# Patient Record
Sex: MALE | Race: WHITE | Employment: OTHER | URBAN - METROPOLITAN AREA
[De-identification: names, ages, dates, MRNs, and addresses within clinical notes are randomized per-mention and may not be internally consistent; named-entity substitution may affect disease eponyms.]

---

## 2020-02-06 ENCOUNTER — HOSPITAL ENCOUNTER (OUTPATIENT)
Dept: GENERAL RADIOLOGY | Age: 70
Discharge: HOME | End: 2020-02-06
Attending: PHYSICIAN ASSISTANT
Payer: MEDICARE

## 2020-02-06 DIAGNOSIS — M25.551 RIGHT HIP PAIN: ICD-10-CM

## 2020-02-06 PROCEDURE — 73502 X-RAY EXAM HIP UNI 2-3 VIEWS: CPT

## 2020-03-05 ENCOUNTER — HOSPITAL ENCOUNTER (OUTPATIENT)
Dept: PREADMISSION TESTING | Age: 70
Discharge: HOME | End: 2020-03-05
Payer: MEDICARE

## 2020-03-05 VITALS
DIASTOLIC BLOOD PRESSURE: 79 MMHG | TEMPERATURE: 98.1 F | HEIGHT: 72 IN | BODY MASS INDEX: 26.55 KG/M2 | RESPIRATION RATE: 18 BRPM | HEART RATE: 69 BPM | WEIGHT: 196 LBS | SYSTOLIC BLOOD PRESSURE: 176 MMHG

## 2020-03-05 DIAGNOSIS — R73.09 ELEVATED HEMOGLOBIN A1C: ICD-10-CM

## 2020-03-05 LAB
ABO + RH BLD: NORMAL
APPEARANCE UR: CLEAR
BACTERIA URNS QL MICRO: NEGATIVE /HPF
BILIRUB UR QL: NEGATIVE
BLOOD GROUP ANTIBODIES SERPL: NORMAL
COLOR UR: ABNORMAL
EPITH CASTS URNS QL MICRO: ABNORMAL /LPF
EST. AVERAGE GLUCOSE BLD GHB EST-MCNC: 171 MG/DL
GLUCOSE UR STRIP.AUTO-MCNC: >1000 MG/DL
HBA1C MFR BLD: 7.6 % (ref 4–5.6)
HGB UR QL STRIP: NEGATIVE
HYALINE CASTS URNS QL MICRO: ABNORMAL /LPF (ref 0–5)
INR PPP: 1.1 (ref 0.9–1.1)
KETONES UR QL STRIP.AUTO: ABNORMAL MG/DL
LEUKOCYTE ESTERASE UR QL STRIP.AUTO: NEGATIVE
NITRITE UR QL STRIP.AUTO: NEGATIVE
PH UR STRIP: 5 [PH] (ref 5–8)
PROT UR STRIP-MCNC: NEGATIVE MG/DL
PROTHROMBIN TIME: 10.8 SEC (ref 9–11.1)
RBC #/AREA URNS HPF: ABNORMAL /HPF (ref 0–5)
SP GR UR REFRACTOMETRY: 1.03 (ref 1–1.03)
SPECIMEN EXP DATE BLD: NORMAL
UA: UC IF INDICATED,UAUC: ABNORMAL
UROBILINOGEN UR QL STRIP.AUTO: 0.2 EU/DL (ref 0.2–1)
WBC URNS QL MICRO: ABNORMAL /HPF (ref 0–4)

## 2020-03-05 PROCEDURE — 85610 PROTHROMBIN TIME: CPT

## 2020-03-05 PROCEDURE — 83036 HEMOGLOBIN GLYCOSYLATED A1C: CPT

## 2020-03-05 PROCEDURE — 86900 BLOOD TYPING SEROLOGIC ABO: CPT

## 2020-03-05 PROCEDURE — 81001 URINALYSIS AUTO W/SCOPE: CPT

## 2020-03-05 RX ORDER — LISINOPRIL 10 MG/1
10 TABLET ORAL
COMMUNITY
End: 2020-03-16 | Stop reason: SDUPTHER

## 2020-03-05 RX ORDER — IBUPROFEN 200 MG
200 TABLET ORAL
COMMUNITY
End: 2020-05-07

## 2020-03-05 RX ORDER — AMLODIPINE BESYLATE 5 MG/1
5 TABLET ORAL
COMMUNITY
End: 2020-03-16 | Stop reason: SDUPTHER

## 2020-03-05 NOTE — PERIOP NOTES
PRE-OPERATIVE INSTRUCTIONS REVIEWED WITH PATIENT. PT GIVEN TIME TO ASK QUESTIONS    PATIENT GIVEN 2-BOTTLE OF CHG SOAP. INSTRUCTIONS (REVIEWED / TO BE REVIEWED IN CLASS) ON USE OF CHG SOAP. PATIENT GIVEN SSI INFECTION FAQ SHEET.   MRSA / MSSA TREATMENT INSTRUCTION SHEET GIVEN

## 2020-03-06 LAB
BACTERIA SPEC CULT: NORMAL
BACTERIA SPEC CULT: NORMAL
SERVICE CMNT-IMP: NORMAL

## 2020-03-06 NOTE — PERIOP NOTES
PAT TEST RESULTS FAXED TO DR. SHELTON'S OFFICE, UNABLE TO FIND \"PROGRAM FOR DIABETES HEALTH\" IN CC FOR REFERRAL, WILL GIVE CHART TO ENRIQUE STEVENSON NP TO HELP WITH THAT. ABNORMAL RESULST CALLED TO SURGEON'S NURSE. PAT PHONE NUMBER GIVEN FOR RETURN CALL IF NEEDED.

## 2020-03-09 PROBLEM — Z22.321 MSSA (METHICILLIN-SUSCEPTIBLE STAPH AUREUS) CARRIER: Status: ACTIVE | Noted: 2020-03-09

## 2020-03-09 PROBLEM — R73.09 ELEVATED HEMOGLOBIN A1C: Status: ACTIVE | Noted: 2020-03-09

## 2020-03-09 RX ORDER — MUPIROCIN 20 MG/G
OINTMENT TOPICAL 2 TIMES DAILY
Qty: 22 G | Refills: 0 | Status: SHIPPED | OUTPATIENT
Start: 2020-03-10 | End: 2020-03-18

## 2020-03-09 RX ORDER — CHLORHEXIDINE GLUCONATE 4 G/100ML
60-120 SOLUTION TOPICAL DAILY
Qty: 840 ML | Refills: 0 | Status: SHIPPED | OUTPATIENT
Start: 2020-03-10 | End: 2020-03-18

## 2020-03-09 NOTE — ADVANCED PRACTICE NURSE
Patient contacted in regard to A1C 7.6 without prior DM dx. He denies increased urination, hunger, thirst, weight loss. Patient educated about A1C test and potential complications of uncontrolled blood glucose and surgery, including infection and difficulty healing. Provided lifestyle modification information. PAT labs routed to PCP, BRAEDEN Bashir, and patient informed of need to f/u. DTC referral complete. Patient was called (identity confirmed with 2 patient identifiers) and informed of positive MSSA, instructed to obtain mupirocin prescription and Chlorhexidine liquid soap from pharmacy per protocol. Written instructions given at time of Preadmission Testing were reinforced with patient. Patient was given an opportunity to have questions answered and contact information if needed. Lea Chavis informed of the above info. 3/9 Taniya Stein PA confirmed receipt of A1C results.

## 2020-03-13 RX ORDER — CELECOXIB 200 MG/1
200 CAPSULE ORAL ONCE
Status: CANCELLED | OUTPATIENT
Start: 2020-03-13 | End: 2020-03-13

## 2020-03-13 RX ORDER — CEFAZOLIN SODIUM/WATER 2 G/20 ML
2 SYRINGE (ML) INTRAVENOUS ONCE
Status: CANCELLED | OUTPATIENT
Start: 2020-03-17 | End: 2020-03-17

## 2020-03-13 RX ORDER — ACETAMINOPHEN 500 MG
1000 TABLET ORAL ONCE
Status: CANCELLED | OUTPATIENT
Start: 2020-03-13 | End: 2020-03-13

## 2020-03-16 ENCOUNTER — ANESTHESIA EVENT (OUTPATIENT)
Dept: SURGERY | Age: 70
End: 2020-03-16
Payer: MEDICARE

## 2020-03-16 NOTE — DISCHARGE INSTRUCTIONS
Post-op Discharge Instructions Following Total Joint Replacement  Hayes Hilario MD  [address]  [phone], ext [phone]  Follow-up Office Visit   See Dr. Jessica Wolfe approximately 3-4 weeks from date of surgery. Call [phone], ext [phone] to make an appointment. Activity   Use your walker for ambulation. Weight bearing as tolerated unless instructed otherwise by the physical therapist. Get up every hour you are awake and take a brief walk. Lengthen walking distance daily as your strength improves.  Continue using your walker until seen in the office for your first follow up visit.  Practice your exercises 3 times daily as instructed by the physical therapist. Bobbette Ramp for 20 minutes after exercising.  No driving until seen in the office for your first follow up visit. Incision Care   The light brown Aquacel surgical dressing is waterproof and is to remain on your incision for 7 days. On the 7th day, carefully lift the edge of the dressing to break the adhesive seal and gently peel it off.  If your Aquacel dressings comes loose or falls off before the 7th day, replace it with a dry sterile gauze dressing and change this dressing daily. Once there is no drainage on the bandage, you mean leave the incision open to air.  You may take a shower with the Aquacel dressing in place. After you remove the Aquacel dressing on day 7, you may continue to shower and get your incision wet in the shower. Do not submerge your incision under water in a bathtub, hot tub, swimming pool, etc. until after you have been evaluated at your first office visit. Medications   Blood Clot Prevention: Take medication as prescribed by your physician for 4 weeks postop.  Pain Management: Take pain medication as prescribed; wean yourself off of pain medication as your pain lessens. Take with food.  You make also take Tylenol every 4-6 hours as needed for pain. Do not exceed 3 grams (3000mg) per day.    Place an ice bag on or around the incision for 20 minutes on / 20 minutes off as needed throughout the day and night, especially after exercising.  Stool Softener: You may want to take a stool softener (such as Senokot-S or Colace) to prevent constipation while taking pain medication. If constipation occurs, you may also use a laxative (such as Dulcolax tablets, Miralax, or a suppository). Diet   Resume usual diet at home. Drink plenty of fluids. Eat foods high in fiber and protein. Calcium and Vitamin D supplements recommended. Avoid alcoholic beverages. No smoking. When to call your Orthopaedic Surgeon: If you call after 5pm or on a weekend, the on call physician will return your call   Pain that is not relieved by pain medication, ice, and activity modification   Signs of infection (red incision, continuous drainage from the incision, malodorous drainage, persistent fever greater than 101 degrees Fahrenheit)   Signs of a blood clot in your leg (calf pain, tenderness, redness, and/or swelling of the lower leg)  ?   When to call your Primary Care Physician   Concerns about your medical conditions such as diabetes, high blood pressure, asthma, congestive heart failure   Call if blood sugars are elevated, if you have a persistent headache or dizziness, coughing or congestion, constipation or diarrhea, burning with urination, abnormal heart rate (fast or slow)  When to call 911 and go to the nearest Emergency Room   Acute onset of chest pain, shortness of breath, difficulty breathing

## 2020-03-17 ENCOUNTER — ANESTHESIA (OUTPATIENT)
Dept: SURGERY | Age: 70
End: 2020-03-17
Payer: MEDICARE

## 2020-03-17 ENCOUNTER — APPOINTMENT (OUTPATIENT)
Dept: GENERAL RADIOLOGY | Age: 70
End: 2020-03-17
Attending: PHYSICIAN ASSISTANT
Payer: MEDICARE

## 2020-03-17 ENCOUNTER — HOSPITAL ENCOUNTER (OUTPATIENT)
Age: 70
Setting detail: OBSERVATION
Discharge: HOME HEALTH | End: 2020-03-18
Attending: ORTHOPAEDIC SURGERY | Admitting: ORTHOPAEDIC SURGERY
Payer: MEDICARE

## 2020-03-17 DIAGNOSIS — M16.11 PRIMARY LOCALIZED OSTEOARTHRITIS OF RIGHT HIP: Primary | ICD-10-CM

## 2020-03-17 PROBLEM — E11.9 TYPE II DIABETES MELLITUS (HCC): Status: ACTIVE | Noted: 2020-03-17

## 2020-03-17 LAB
GLUCOSE BLD STRIP.AUTO-MCNC: 176 MG/DL (ref 65–100)
GLUCOSE BLD STRIP.AUTO-MCNC: 371 MG/DL (ref 65–100)
SERVICE CMNT-IMP: ABNORMAL
SERVICE CMNT-IMP: ABNORMAL

## 2020-03-17 PROCEDURE — 77030003882 HC BIT DRL TWST BRSM -B: Performed by: ORTHOPAEDIC SURGERY

## 2020-03-17 PROCEDURE — 99218 HC RM OBSERVATION: CPT

## 2020-03-17 PROCEDURE — 77030018836 HC SOL IRR NACL ICUM -A: Performed by: ORTHOPAEDIC SURGERY

## 2020-03-17 PROCEDURE — 77030031139 HC SUT VCRL2 J&J -A: Performed by: ORTHOPAEDIC SURGERY

## 2020-03-17 PROCEDURE — 97116 GAIT TRAINING THERAPY: CPT

## 2020-03-17 PROCEDURE — 74011000250 HC RX REV CODE- 250: Performed by: NURSE ANESTHETIST, CERTIFIED REGISTERED

## 2020-03-17 PROCEDURE — 77030033067 HC SUT PDO STRATFX SPIR J&J -B: Performed by: ORTHOPAEDIC SURGERY

## 2020-03-17 PROCEDURE — 76060000035 HC ANESTHESIA 2 TO 2.5 HR: Performed by: ORTHOPAEDIC SURGERY

## 2020-03-17 PROCEDURE — 77030018673: Performed by: ORTHOPAEDIC SURGERY

## 2020-03-17 PROCEDURE — C1776 JOINT DEVICE (IMPLANTABLE): HCPCS | Performed by: ORTHOPAEDIC SURGERY

## 2020-03-17 PROCEDURE — 77030007866 HC KT SPN ANES BBMI -B: Performed by: ANESTHESIOLOGY

## 2020-03-17 PROCEDURE — 74011250636 HC RX REV CODE- 250/636: Performed by: ANESTHESIOLOGY

## 2020-03-17 PROCEDURE — 74011250637 HC RX REV CODE- 250/637: Performed by: ORTHOPAEDIC SURGERY

## 2020-03-17 PROCEDURE — 74011000250 HC RX REV CODE- 250: Performed by: ORTHOPAEDIC SURGERY

## 2020-03-17 PROCEDURE — 74011000258 HC RX REV CODE- 258: Performed by: ORTHOPAEDIC SURGERY

## 2020-03-17 PROCEDURE — 76010000171 HC OR TIME 2 TO 2.5 HR INTENSV-TIER 1: Performed by: ORTHOPAEDIC SURGERY

## 2020-03-17 PROCEDURE — 77030011640 HC PAD GRND REM COVD -A: Performed by: ORTHOPAEDIC SURGERY

## 2020-03-17 PROCEDURE — 73501 X-RAY EXAM HIP UNI 1 VIEW: CPT

## 2020-03-17 PROCEDURE — 74011000272 HC RX REV CODE- 272: Performed by: ORTHOPAEDIC SURGERY

## 2020-03-17 PROCEDURE — 77030041397 HC DRSG PRIMASEAL AG MDII -B: Performed by: ORTHOPAEDIC SURGERY

## 2020-03-17 PROCEDURE — 74011250636 HC RX REV CODE- 250/636: Performed by: NURSE ANESTHETIST, CERTIFIED REGISTERED

## 2020-03-17 PROCEDURE — 82962 GLUCOSE BLOOD TEST: CPT

## 2020-03-17 PROCEDURE — 77030002933 HC SUT MCRYL J&J -A: Performed by: ORTHOPAEDIC SURGERY

## 2020-03-17 PROCEDURE — 94760 N-INVAS EAR/PLS OXIMETRY 1: CPT

## 2020-03-17 PROCEDURE — 74011250636 HC RX REV CODE- 250/636

## 2020-03-17 PROCEDURE — 76210000063 HC OR PH I REC FIRST 0.5 HR: Performed by: ORTHOPAEDIC SURGERY

## 2020-03-17 PROCEDURE — 77030040361 HC SLV COMPR DVT MDII -B

## 2020-03-17 PROCEDURE — 77030010507 HC ADH SKN DERMBND J&J -B: Performed by: ORTHOPAEDIC SURGERY

## 2020-03-17 PROCEDURE — 77030006822 HC BLD SAW SAG BRSM -B: Performed by: ORTHOPAEDIC SURGERY

## 2020-03-17 PROCEDURE — 77030018547 HC SUT ETHBND1 J&J -B: Performed by: ORTHOPAEDIC SURGERY

## 2020-03-17 PROCEDURE — 77030018723 HC ELCTRD BLD COVD -A: Performed by: ORTHOPAEDIC SURGERY

## 2020-03-17 PROCEDURE — 97161 PT EVAL LOW COMPLEX 20 MIN: CPT

## 2020-03-17 PROCEDURE — 74011250637 HC RX REV CODE- 250/637: Performed by: PHYSICIAN ASSISTANT

## 2020-03-17 PROCEDURE — 74011250636 HC RX REV CODE- 250/636: Performed by: ORTHOPAEDIC SURGERY

## 2020-03-17 PROCEDURE — 74011250636 HC RX REV CODE- 250/636: Performed by: PHYSICIAN ASSISTANT

## 2020-03-17 PROCEDURE — 77030018074 HC RTVR SUT ARTH4 S&N -B: Performed by: ORTHOPAEDIC SURGERY

## 2020-03-17 PROCEDURE — 77030018846 HC SOL IRR STRL H20 ICUM -A: Performed by: ORTHOPAEDIC SURGERY

## 2020-03-17 PROCEDURE — 74011636637 HC RX REV CODE- 636/637: Performed by: ORTHOPAEDIC SURGERY

## 2020-03-17 DEVICE — COMPONENT HIP PRSS FT H1 CERM ON CERM POLYETH: Type: IMPLANTABLE DEVICE | Status: FUNCTIONAL

## 2020-03-17 RX ORDER — CEFAZOLIN SODIUM/WATER 2 G/20 ML
2 SYRINGE (ML) INTRAVENOUS ONCE
Status: COMPLETED | OUTPATIENT
Start: 2020-03-17 | End: 2020-03-17

## 2020-03-17 RX ORDER — BUPIVACAINE HYDROCHLORIDE 5 MG/ML
INJECTION, SOLUTION EPIDURAL; INTRACAUDAL AS NEEDED
Status: DISCONTINUED | OUTPATIENT
Start: 2020-03-17 | End: 2020-03-17 | Stop reason: HOSPADM

## 2020-03-17 RX ORDER — HYDROMORPHONE HYDROCHLORIDE 1 MG/ML
0.5 INJECTION, SOLUTION INTRAMUSCULAR; INTRAVENOUS; SUBCUTANEOUS
Status: DISCONTINUED | OUTPATIENT
Start: 2020-03-17 | End: 2020-03-18 | Stop reason: HOSPADM

## 2020-03-17 RX ORDER — ACETAMINOPHEN 500 MG
500 TABLET ORAL EVERY 4 HOURS
Qty: 100 TAB | Refills: 0 | Status: SHIPPED
Start: 2020-03-17 | End: 2021-03-04

## 2020-03-17 RX ORDER — FACIAL-BODY WIPES
10 EACH TOPICAL DAILY PRN
Status: DISCONTINUED | OUTPATIENT
Start: 2020-03-18 | End: 2020-03-18 | Stop reason: HOSPADM

## 2020-03-17 RX ORDER — DEXAMETHASONE SODIUM PHOSPHATE 4 MG/ML
INJECTION, SOLUTION INTRA-ARTICULAR; INTRALESIONAL; INTRAMUSCULAR; INTRAVENOUS; SOFT TISSUE AS NEEDED
Status: DISCONTINUED | OUTPATIENT
Start: 2020-03-17 | End: 2020-03-17 | Stop reason: HOSPADM

## 2020-03-17 RX ORDER — INSULIN LISPRO 100 [IU]/ML
INJECTION, SOLUTION INTRAVENOUS; SUBCUTANEOUS
Status: DISCONTINUED | OUTPATIENT
Start: 2020-03-17 | End: 2020-03-18 | Stop reason: HOSPADM

## 2020-03-17 RX ORDER — LISINOPRIL 10 MG/1
20 TABLET ORAL 2 TIMES DAILY
Status: DISCONTINUED | OUTPATIENT
Start: 2020-03-17 | End: 2020-03-18 | Stop reason: HOSPADM

## 2020-03-17 RX ORDER — AMOXICILLIN 250 MG
1 CAPSULE ORAL 2 TIMES DAILY
Status: DISCONTINUED | OUTPATIENT
Start: 2020-03-17 | End: 2020-03-18 | Stop reason: HOSPADM

## 2020-03-17 RX ORDER — ONDANSETRON 2 MG/ML
INJECTION INTRAMUSCULAR; INTRAVENOUS
Status: COMPLETED
Start: 2020-03-17 | End: 2020-03-17

## 2020-03-17 RX ORDER — TRAMADOL HYDROCHLORIDE 50 MG/1
50-100 TABLET ORAL
Status: DISCONTINUED | OUTPATIENT
Start: 2020-03-17 | End: 2020-03-18 | Stop reason: HOSPADM

## 2020-03-17 RX ORDER — HYDROXYZINE HYDROCHLORIDE 10 MG/1
10 TABLET, FILM COATED ORAL
Status: DISCONTINUED | OUTPATIENT
Start: 2020-03-17 | End: 2020-03-18 | Stop reason: HOSPADM

## 2020-03-17 RX ORDER — MIDAZOLAM HYDROCHLORIDE 1 MG/ML
INJECTION, SOLUTION INTRAMUSCULAR; INTRAVENOUS AS NEEDED
Status: DISCONTINUED | OUTPATIENT
Start: 2020-03-17 | End: 2020-03-17 | Stop reason: HOSPADM

## 2020-03-17 RX ORDER — AMOXICILLIN 250 MG
1 CAPSULE ORAL
Qty: 60 TAB | Refills: 0 | Status: SHIPPED | OUTPATIENT
Start: 2020-03-17 | End: 2020-05-07

## 2020-03-17 RX ORDER — ASPIRIN 81 MG/1
81 TABLET ORAL EVERY 12 HOURS
Status: DISCONTINUED | OUTPATIENT
Start: 2020-03-17 | End: 2020-03-18 | Stop reason: HOSPADM

## 2020-03-17 RX ORDER — SODIUM CHLORIDE 0.9 % (FLUSH) 0.9 %
5-40 SYRINGE (ML) INJECTION EVERY 8 HOURS
Status: DISCONTINUED | OUTPATIENT
Start: 2020-03-17 | End: 2020-03-18 | Stop reason: HOSPADM

## 2020-03-17 RX ORDER — CEFAZOLIN SODIUM/WATER 2 G/20 ML
2 SYRINGE (ML) INTRAVENOUS EVERY 8 HOURS
Status: COMPLETED | OUTPATIENT
Start: 2020-03-17 | End: 2020-03-18

## 2020-03-17 RX ORDER — MAGNESIUM SULFATE 100 %
4 CRYSTALS MISCELLANEOUS AS NEEDED
Status: DISCONTINUED | OUTPATIENT
Start: 2020-03-17 | End: 2020-03-18 | Stop reason: HOSPADM

## 2020-03-17 RX ORDER — POLYETHYLENE GLYCOL 3350 17 G/17G
17 POWDER, FOR SOLUTION ORAL DAILY
Status: DISCONTINUED | OUTPATIENT
Start: 2020-03-18 | End: 2020-03-18 | Stop reason: HOSPADM

## 2020-03-17 RX ORDER — PHENYLEPHRINE HCL IN 0.9% NACL 0.4MG/10ML
SYRINGE (ML) INTRAVENOUS AS NEEDED
Status: DISCONTINUED | OUTPATIENT
Start: 2020-03-17 | End: 2020-03-17 | Stop reason: HOSPADM

## 2020-03-17 RX ORDER — ONDANSETRON 2 MG/ML
INJECTION INTRAMUSCULAR; INTRAVENOUS AS NEEDED
Status: DISCONTINUED | OUTPATIENT
Start: 2020-03-17 | End: 2020-03-17 | Stop reason: HOSPADM

## 2020-03-17 RX ORDER — ONDANSETRON 2 MG/ML
4 INJECTION INTRAMUSCULAR; INTRAVENOUS
Status: DISCONTINUED | OUTPATIENT
Start: 2020-03-17 | End: 2020-03-18 | Stop reason: HOSPADM

## 2020-03-17 RX ORDER — TRAMADOL HYDROCHLORIDE 50 MG/1
50-100 TABLET ORAL
Qty: 42 TAB | Refills: 0 | Status: SHIPPED | OUTPATIENT
Start: 2020-03-17 | End: 2020-03-24

## 2020-03-17 RX ORDER — SODIUM CHLORIDE, SODIUM LACTATE, POTASSIUM CHLORIDE, CALCIUM CHLORIDE 600; 310; 30; 20 MG/100ML; MG/100ML; MG/100ML; MG/100ML
25 INJECTION, SOLUTION INTRAVENOUS CONTINUOUS
Status: DISCONTINUED | OUTPATIENT
Start: 2020-03-17 | End: 2020-03-17 | Stop reason: HOSPADM

## 2020-03-17 RX ORDER — DEXTROSE MONOHYDRATE 100 MG/ML
0-250 INJECTION, SOLUTION INTRAVENOUS AS NEEDED
Status: DISCONTINUED | OUTPATIENT
Start: 2020-03-17 | End: 2020-03-18 | Stop reason: HOSPADM

## 2020-03-17 RX ORDER — AMLODIPINE BESYLATE 5 MG/1
10 TABLET ORAL DAILY
Status: DISCONTINUED | OUTPATIENT
Start: 2020-03-18 | End: 2020-03-18 | Stop reason: HOSPADM

## 2020-03-17 RX ORDER — SODIUM CHLORIDE 9 MG/ML
125 INJECTION, SOLUTION INTRAVENOUS CONTINUOUS
Status: DISCONTINUED | OUTPATIENT
Start: 2020-03-17 | End: 2020-03-18 | Stop reason: HOSPADM

## 2020-03-17 RX ORDER — ONDANSETRON 2 MG/ML
4 INJECTION INTRAMUSCULAR; INTRAVENOUS ONCE
Status: COMPLETED | OUTPATIENT
Start: 2020-03-17 | End: 2020-03-17

## 2020-03-17 RX ORDER — ACETAMINOPHEN 500 MG
500 TABLET ORAL EVERY 4 HOURS
Status: DISCONTINUED | OUTPATIENT
Start: 2020-03-17 | End: 2020-03-18 | Stop reason: HOSPADM

## 2020-03-17 RX ORDER — NALOXONE HYDROCHLORIDE 0.4 MG/ML
0.4 INJECTION, SOLUTION INTRAMUSCULAR; INTRAVENOUS; SUBCUTANEOUS AS NEEDED
Status: DISCONTINUED | OUTPATIENT
Start: 2020-03-17 | End: 2020-03-18 | Stop reason: HOSPADM

## 2020-03-17 RX ORDER — EPHEDRINE SULFATE/0.9% NACL/PF 50 MG/5 ML
SYRINGE (ML) INTRAVENOUS AS NEEDED
Status: DISCONTINUED | OUTPATIENT
Start: 2020-03-17 | End: 2020-03-17 | Stop reason: HOSPADM

## 2020-03-17 RX ORDER — ASPIRIN 81 MG/1
81 TABLET ORAL 2 TIMES DAILY
Qty: 60 TAB | Refills: 0 | Status: SHIPPED | OUTPATIENT
Start: 2020-03-17

## 2020-03-17 RX ORDER — SODIUM CHLORIDE 0.9 % (FLUSH) 0.9 %
5-40 SYRINGE (ML) INJECTION AS NEEDED
Status: DISCONTINUED | OUTPATIENT
Start: 2020-03-17 | End: 2020-03-18 | Stop reason: HOSPADM

## 2020-03-17 RX ORDER — LIDOCAINE HYDROCHLORIDE 20 MG/ML
INJECTION, SOLUTION EPIDURAL; INFILTRATION; INTRACAUDAL; PERINEURAL AS NEEDED
Status: DISCONTINUED | OUTPATIENT
Start: 2020-03-17 | End: 2020-03-17 | Stop reason: HOSPADM

## 2020-03-17 RX ORDER — ACETAMINOPHEN 500 MG
1000 TABLET ORAL ONCE
Status: COMPLETED | OUTPATIENT
Start: 2020-03-17 | End: 2020-03-17

## 2020-03-17 RX ORDER — KETOROLAC TROMETHAMINE 30 MG/ML
15 INJECTION, SOLUTION INTRAMUSCULAR; INTRAVENOUS EVERY 6 HOURS
Status: DISCONTINUED | OUTPATIENT
Start: 2020-03-17 | End: 2020-03-18 | Stop reason: HOSPADM

## 2020-03-17 RX ORDER — PROPOFOL 10 MG/ML
INJECTION, EMULSION INTRAVENOUS
Status: DISCONTINUED | OUTPATIENT
Start: 2020-03-17 | End: 2020-03-17 | Stop reason: HOSPADM

## 2020-03-17 RX ORDER — CELECOXIB 200 MG/1
200 CAPSULE ORAL ONCE
Status: COMPLETED | OUTPATIENT
Start: 2020-03-17 | End: 2020-03-17

## 2020-03-17 RX ORDER — PROPOFOL 10 MG/ML
INJECTION, EMULSION INTRAVENOUS AS NEEDED
Status: DISCONTINUED | OUTPATIENT
Start: 2020-03-17 | End: 2020-03-17 | Stop reason: HOSPADM

## 2020-03-17 RX ADMIN — INSULIN LISPRO 4 UNITS: 100 INJECTION, SOLUTION INTRAVENOUS; SUBCUTANEOUS at 22:39

## 2020-03-17 RX ADMIN — ASPIRIN 81 MG: 81 TABLET, COATED ORAL at 14:06

## 2020-03-17 RX ADMIN — PROPOFOL 75 MCG/KG/MIN: 10 INJECTION, EMULSION INTRAVENOUS at 08:58

## 2020-03-17 RX ADMIN — PROPOFOL 40 MG: 10 INJECTION, EMULSION INTRAVENOUS at 08:58

## 2020-03-17 RX ADMIN — LIDOCAINE HYDROCHLORIDE 40 MG: 20 INJECTION, SOLUTION EPIDURAL; INFILTRATION; INTRACAUDAL; PERINEURAL at 08:58

## 2020-03-17 RX ADMIN — BUPIVACAINE HYDROCHLORIDE 10 MG: 5 INJECTION, SOLUTION EPIDURAL; INTRACAUDAL; PERINEURAL at 09:05

## 2020-03-17 RX ADMIN — SENNOSIDES AND DOCUSATE SODIUM 1 TABLET: 8.6; 5 TABLET ORAL at 17:02

## 2020-03-17 RX ADMIN — SODIUM CHLORIDE, SODIUM LACTATE, POTASSIUM CHLORIDE, AND CALCIUM CHLORIDE 25 ML/HR: 600; 310; 30; 20 INJECTION, SOLUTION INTRAVENOUS at 08:07

## 2020-03-17 RX ADMIN — ONDANSETRON HYDROCHLORIDE 4 MG: 2 INJECTION, SOLUTION INTRAMUSCULAR; INTRAVENOUS at 10:18

## 2020-03-17 RX ADMIN — Medication 120 MCG: at 10:24

## 2020-03-17 RX ADMIN — KETOROLAC TROMETHAMINE 15 MG: 30 INJECTION, SOLUTION INTRAMUSCULAR at 17:02

## 2020-03-17 RX ADMIN — ASPIRIN 81 MG: 81 TABLET, COATED ORAL at 19:23

## 2020-03-17 RX ADMIN — ACETAMINOPHEN 500 MG: 500 TABLET ORAL at 14:08

## 2020-03-17 RX ADMIN — SODIUM CHLORIDE, SODIUM LACTATE, POTASSIUM CHLORIDE, AND CALCIUM CHLORIDE: 600; 310; 30; 20 INJECTION, SOLUTION INTRAVENOUS at 10:47

## 2020-03-17 RX ADMIN — TRANEXAMIC ACID 1 G: 100 INJECTION, SOLUTION INTRAVENOUS at 09:15

## 2020-03-17 RX ADMIN — CELECOXIB 200 MG: 200 CAPSULE ORAL at 08:12

## 2020-03-17 RX ADMIN — Medication 2 G: at 17:02

## 2020-03-17 RX ADMIN — Medication 80 MCG: at 10:18

## 2020-03-17 RX ADMIN — SENNOSIDES AND DOCUSATE SODIUM 1 TABLET: 8.6; 5 TABLET ORAL at 14:06

## 2020-03-17 RX ADMIN — Medication 2 G: at 09:08

## 2020-03-17 RX ADMIN — Medication 80 MCG: at 10:06

## 2020-03-17 RX ADMIN — LISINOPRIL 20 MG: 10 TABLET ORAL at 17:02

## 2020-03-17 RX ADMIN — DEXAMETHASONE SODIUM PHOSPHATE 10 MG: 4 INJECTION, SOLUTION INTRAMUSCULAR; INTRAVENOUS at 09:16

## 2020-03-17 RX ADMIN — Medication 80 MCG: at 09:25

## 2020-03-17 RX ADMIN — Medication 10 MG: at 10:26

## 2020-03-17 RX ADMIN — ACETAMINOPHEN 1000 MG: 500 TABLET ORAL at 08:12

## 2020-03-17 RX ADMIN — ACETAMINOPHEN 500 MG: 500 TABLET ORAL at 22:39

## 2020-03-17 RX ADMIN — ONDANSETRON 4 MG: 2 INJECTION, SOLUTION INTRAMUSCULAR; INTRAVENOUS at 11:42

## 2020-03-17 RX ADMIN — Medication 80 MCG: at 09:38

## 2020-03-17 RX ADMIN — MIDAZOLAM 2 MG: 1 INJECTION INTRAMUSCULAR; INTRAVENOUS at 08:51

## 2020-03-17 RX ADMIN — PROPOFOL 40 MG: 10 INJECTION, EMULSION INTRAVENOUS at 09:02

## 2020-03-17 RX ADMIN — ONDANSETRON 4 MG: 2 INJECTION INTRAMUSCULAR; INTRAVENOUS at 11:42

## 2020-03-17 RX ADMIN — ACETAMINOPHEN 500 MG: 500 TABLET ORAL at 17:02

## 2020-03-17 RX ADMIN — Medication 15 MG: at 10:40

## 2020-03-17 RX ADMIN — Medication 120 MCG: at 09:54

## 2020-03-17 RX ADMIN — SODIUM CHLORIDE 125 ML/HR: 900 INJECTION, SOLUTION INTRAVENOUS at 11:17

## 2020-03-17 RX ADMIN — Medication 40 MCG: at 10:14

## 2020-03-17 RX ADMIN — SODIUM CHLORIDE 125 ML/HR: 900 INJECTION, SOLUTION INTRAVENOUS at 20:37

## 2020-03-17 RX ADMIN — TRAMADOL HYDROCHLORIDE 50 MG: 50 TABLET, FILM COATED ORAL at 22:39

## 2020-03-17 NOTE — PROGRESS NOTES
Occupational Therapy   Orders received, chart review completed. Note patient POD #0 and not a fast track at this time. OT will follow up tomorrow for evaluation. Recommend OOB to chair three times a day for meals and self-completion of ADLs as able and medically stable. Thank you.

## 2020-03-17 NOTE — PROGRESS NOTES
Ortho Post-Op Note    3/17/2020  3:48 PM    POD:  Day of Surgery  S/P:  Procedure(s):  RIGHT TOTAL HIP ARTHROPLASTY ( SPINAL W/ IV SEDATION W/ BLOCK)    Afebrile/VSS, NAD, A&O x 3  Doing well without complaints of nausea  Pain well controlled  Calves soft/NTTP Bilaterally  Incision OK, no drainage or dehiscence. Thigh soft. Dressing clean and dry  Moving lower extremities well. Neurocirculatory exam intact and within normal range. A1c 7.4, will consult DTC for home assessment and will F/U with PCP  Lab Results   Component Value Date/Time    HGB 15.4 02/06/2020 01:56 PM    INR 1.1 03/05/2020 08:29 AM     Recent Labs     02/06/20  1356   CREA 0.97   BUN 18     Estimated Creatinine Clearance: 80 mL/min (by C-G formula based on SCr of 0.97 mg/dL).     PLAN:  DVT prophylaxis: ASA 81 mg bid  WBAT with PT-mobilization  Pain Control: Tylenol, toradol, oxycodone  Plan to D/C home with HH/PT tomorrow      BRAEDEN Wilkes

## 2020-03-17 NOTE — ANESTHESIA PROCEDURE NOTES
Spinal Block    Start time: 3/17/2020 8:52 AM  End time: 3/17/2020 8:56 AM  Performed by: Giorgi Gay MD  Authorized by: Giorgi Gay MD     Pre-procedure:   Indications: primary anesthetic  Preanesthetic Checklist: patient identified, risks and benefits discussed, anesthesia consent, site marked, patient being monitored and timeout performed    Timeout Time: 08:52          Spinal Block:   Patient Position:  Seated    Prep: Betadine      Location:  L3-4  Technique:  Single shot        Needle:   Needle Type:  Pencil-tip  Needle Gauge:  24 G  Attempts:  1      Events: CSF confirmed, no blood with aspiration and no paresthesia        Assessment:  Insertion:  Uncomplicated  Patient tolerance:  Patient tolerated the procedure well with no immediate complications

## 2020-03-17 NOTE — BRIEF OP NOTE
BRIEF OPERATIVE NOTE    Date of Procedure: 3/17/2020   Preoperative Diagnosis: OSTEOARTHRITIS RIGHT HIP  Postoperative Diagnosis: OSTEOARTHRITIS RIGHT HIP    Procedure(s):  RIGHT TOTAL HIP ARTHROPLASTY ( SPINAL W/ IV SEDATION W/ BLOCK)  Surgeon(s) and Role:     Janak Angel MD - Primary         Surgical Assistant: Horace Romero PA-C     Surgical Staff:  Circ-1: Jozef Maddox RN  Physician Assistant: Manju Ellington PA-C  Scrub Tech-1: Jennifer Fuchs  Surg Asst-1: Michelle BARTLETT  Event Time In   Incision Start 0932   Incision Close      Anesthesia: Spinal   Estimated Blood Loss: 250cc  Specimens: * No specimens in log *   Findings: right hip oa   Complications: none  Implants:   Implant Name Type Inv.  Item Serial No.  Lot No. LRB No. Used Action   empowr acetabular cup, cluster hole, 58h   N/A [employer] [id number] Right 1 Implanted   empowr acet liner, hxe+, neutral 36H   N/A [employer] [id number] Right 1 Implanted   femoral hip   N/A [employer] [id number] Right 1 Implanted   empowr acetabular bone screw, 6.5x40mm   N/A [employer] [id number] Right 1 Implanted   biolox delta ceramix femoral head   N/A [employer] [id number] Right 1 Implanted

## 2020-03-17 NOTE — PROGRESS NOTES
Problem: Mobility Impaired (Adult and Pediatric)  Goal: *Acute Goals and Plan of Care (Insert Text)  Description: FUNCTIONAL STATUS PRIOR TO ADMISSION: Patient was independent and active without use of DME.    HOME SUPPORT PRIOR TO ADMISSION: The patient lived with sister but did not require assist.    Physical Therapy Goals  Initiated 3/17/2020    1. Patient will move from supine to sit and sit to supine  and roll side to side in bed with modified independence within 4 days. 2. Patient will perform sit to stand with modified independence within 4 days. 3. Patient will ambulate with modified independence for 150 feet with the least restrictive device within 4 days. 4. Patient will ascend/descend 4 stairs with 1 handrail(s) with modified independence within 4 days. 5. Patient will perform EDISON home exercise program per protocol with modified independence within 4 days. Outcome: Progressing Towards Goal   PHYSICAL THERAPY EVALUATION  Patient: Tae Owen (21 y.o. male)  Date: 3/17/2020  Primary Diagnosis: Primary localized osteoarthritis of right hip [M16.11]  Procedure(s) (LRB):  RIGHT TOTAL HIP ARTHROPLASTY ( SPINAL W/ IV SEDATION W/ BLOCK) (Right) Day of Surgery   Precautions:   Fall, WBAT      ASSESSMENT  Based on the objective data described below, the patient presents with decreased mobility after R EDISON, POD0. He was able to ambulate with the RW with CGA for safety and VSS. His pain control is very good and expect that he will progress well with his mobility and be able to return home with HHPT likely after AM session tomorrow. Note that he does not have a RW at home, but he wants to use crutches. We will train and assess on crutches tomorrow. At home, he lives with his elderly sister and he will need to be independent with mobility and stairs prior to discharge. He is somewhat impulsive and needed reminders for moving slowly and safely.  Reviewed safety considerations related to activity and he acknowledged understanding. Current Level of Function Impacting Discharge (mobility/balance): CGA with RW    Functional Outcome Measure: The patient scored Total: 55/100 on the Barthel Index which is indicative of moderately impaired ability to care for basic self needs/dependency on others. Other factors to consider for discharge: safety for home     Patient will benefit from skilled therapy intervention to address the above noted impairments. PLAN :  Recommendations and Planned Interventions: bed mobility training, transfer training, gait training, therapeutic exercises, patient and family training/education, and therapeutic activities      Frequency/Duration: Patient will be followed by physical therapy:  twice daily to address goals. Recommendation for discharge: (in order for the patient to meet his/her long term goals)  Physical therapy at least 2 days/week in the home     This discharge recommendation:  Has been made in collaboration with the attending provider and/or case management    IF patient discharges home will need the following DME: to be determined (TBD) and he prefers crutches over a walker, so no walker was ordered         SUBJECTIVE:   Patient stated Joslyn Gain can I drive? 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    Organ donor     ON BRAIN DONOR LIST     Past Surgical History:   Procedure Laterality Date    HX HEENT      WISDOM TEETH        Personal factors and/or comorbidities impacting plan of care: R EDISON, HTN    Home Situation  Home Environment: Private residence  One/Two Story Residence: One story  Living Alone: No  Support Systems: Family member(s)  Patient Expects to be Discharged to[de-identified] Private residence  Current DME Used/Available at Home: None    EXAMINATION/PRESENTATION/DECISION MAKING:   Critical Behavior:  Neurologic State: Alert  Orientation Level: Oriented X4  Cognition: Follows commands     Hearing: Auditory  Auditory Impairment: None  Skin:  post op dressing in place with no drainage  Edema: mild R thigh edema  Range Of Motion:  AROM: Within functional limits                       Strength:    Strength: Within functional limits                    Tone & Sensation:   Tone: Normal              Sensation: Intact               Coordination:  Coordination: Within functional limits  Vision:      Functional Mobility:  Bed Mobility:     Supine to Sit: Stand-by assistance; Additional time  Sit to Supine: Stand-by assistance; Additional time     Transfers:  Sit to Stand: Contact guard assistance; Adaptive equipment; Additional time  Stand to Sit: Contact guard assistance; Adaptive equipment; Additional time                       Balance:   Sitting: Intact; Without support  Standing: Intact; With support  Ambulation/Gait Training:  Distance (ft): 60 Feet (ft)  Assistive Device: Gait belt;Walker, rolling  Ambulation - Level of Assistance: Contact guard assistance; Adaptive equipment; Additional time        Gait Abnormalities: Antalgic;Decreased step clearance  Right Side Weight Bearing: As tolerated  Left Side Weight Bearing: Full  Base of Support: Shift to left  Stance: Left decreased  Speed/Jessica: Pace decreased (<100 feet/min)  Step Length: Left shortened;Right shortened  Swing Pattern: Left asymmetrical     Interventions: Safety awareness training; Tactile cues; Verbal cues; Visual/Demos            Stairs: Therapeutic Exercises: Ankle pumps    Functional Measure:  Barthel Index:    Bathin  Bladder: 10  Bowels: 10  Groomin  Dressin  Feeding: 10  Mobility: 0  Stairs: 0  Toilet Use: 5  Transfer (Bed to Chair and Back): 10  Total: 55/100       The Barthel ADL Index: Guidelines  1. The index should be used as a record of what a patient does, not as a record of what a patient could do.   2. The main aim is to establish degree of independence from any help, physical or verbal, however minor and for whatever reason. 3. The need for supervision renders the patient not independent. 4. A patient's performance should be established using the best available evidence. Asking the patient, friends/relatives and nurses are the usual sources, but direct observation and common sense are also important. However direct testing is not needed. 5. Usually the patient's performance over the preceding 24-48 hours is important, but occasionally longer periods will be relevant. 6. Middle categories imply that the patient supplies over 50 per cent of the effort. 7. Use of aids to be independent is allowed. Mayte Curran., Barthel, D.W. (4443). Functional evaluation: the Barthel Index. [address] [facility] (14)2. Sona Jules marilyn SUNSHINE Torrez, Jarrett Mcfadden., Luis Echeverria., Chan, [address] [place] [address] (1999). Measuring the change indisability after inpatient rehabilitation; comparison of the responsiveness of the Barthel Index and Functional [place] Measure. Journal of Neurology, Neurosurgery, and Psychiatry, 66(4), 791-697. Angela Bonilla, N.J.A, JATIN Jordan, & Taylor Quintero MEstherA. (2004.) Assessment of post-stroke quality of life in cost-effectiveness studies: The usefulness of the Barthel Index and the EuroQoL-5D.  Quality of Life Research, 15, 076-27        Physical Therapy Evaluation Charge Determination   History Examination Presentation Decision-Making   MEDIUM  Complexity : 1-2 comorbidities / personal factors will impact the outcome/ POC  MEDIUM Complexity : 3 Standardized tests and measures addressing body structure, function, activity limitation and / or participation in recreation  LOW Complexity : Stable, uncomplicated  LOW Complexity : FOTO score of       Based on the above components, the patient evaluation is determined to be of the following complexity level: LOW     Pain Rating:  No complaints of pain    Activity Tolerance:   Good  Please refer to the flowsheet for vital signs taken during this treatment. After treatment patient left in no apparent distress:   Supine in bed, Call bell within reach, Side rails x 3, and ice in place R hip     COMMUNICATION/EDUCATION:   The patients plan of care was discussed with: Registered nurse. Fall prevention education was provided and the patient/caregiver indicated understanding., Patient/family have participated as able in goal setting and plan of care. , and Patient/family agree to work toward stated goals and plan of care.     Thank you for this referral.  Sarita Marinelli, PT   Time Calculation: 18 mins

## 2020-03-17 NOTE — ANESTHESIA POSTPROCEDURE EVALUATION
Post-Anesthesia Evaluation and Assessment    Patient: Sherry Garcia MRN: [id number]  SSN: [id number]    YOB: 1950  Age: 78 y.o. Sex: male      I have evaluated the patient and they are stable and ready for discharge from the PACU. Cardiovascular Function/Vital Signs  Visit Vitals  /85   Pulse 91   Temp 36.7 °C (98.1 °F)   Resp 18   Ht 6' (1.829 m)   Wt 88.9 kg (196 lb)   SpO2 95%   BMI 26.58 kg/m²       Patient is status post Spinal anesthesia for Procedure(s):  RIGHT TOTAL HIP ARTHROPLASTY ( SPINAL W/ IV SEDATION W/ BLOCK). Nausea/Vomiting: None    Postoperative hydration reviewed and adequate. Pain:  Pain Scale 1: Numeric (0 - 10) (03/17/20 1530)  Pain Intensity 1: 0 (03/17/20 1530)   Managed    Neurological Status:   Neuro (WDL): Exceptions to WDL (03/17/20 1210)  Neuro  Neurologic State: Alert (03/17/20 1525)  Orientation Level: Oriented X4 (03/17/20 1525)  Cognition: Follows commands (03/17/20 1525)  Speech: Clear (03/17/20 1525)  LUE Motor Response: Purposeful (03/17/20 1525)  LLE Motor Response: Purposeful (03/17/20 1525)  RUE Motor Response: Purposeful (03/17/20 1525)  RLE Motor Response: Purposeful (03/17/20 1525)   At baseline    Mental Status, Level of Consciousness: Alert and  oriented to person, place, and time    Pulmonary Status:   O2 Device: Room air (03/17/20 1519)   Adequate oxygenation and airway patent    Complications related to anesthesia: None    Post-anesthesia assessment completed. No concerns    Signed By: Leif Loredo MD     March 17, 2020              Procedure(s):  RIGHT TOTAL HIP ARTHROPLASTY ( SPINAL W/ IV SEDATION W/ BLOCK).     spinal    <BSHSIANPOST>    Vitals Value Taken Time   /67 3/17/2020 12:00 PM   Temp 36.4 °C (97.5 °F) 3/17/2020 12:10 PM   Pulse 84 3/17/2020 12:02 PM   Resp 12 3/17/2020 12:02 PM   SpO2 100 % 3/17/2020 12:02 PM

## 2020-03-17 NOTE — DISCHARGE SUMMARY
[address]     DISCHARGE SUMMARY     Name: Ibrahima Kemp       MR#: [id number]    : 1950  ADMIT DATE: 3/17/2020  DISCHARGE DATE: 3/18/2020     ADMISSION DIAGNOSIS: Primary localized osteoarthritis of right hip [M16.11]     DISCHARGE DIAGNOSIS: OSTEOARTHRITIS RIGHT HIP     PROCEDURE PERFORMED: Procedure(s):  RIGHT TOTAL HIP ARTHROPLASTY ( SPINAL W/ IV SEDATION W/ BLOCK)     CONSULTATIONS:  None.     HISTORY OF PRESENT ILLNESS: The patient is a 73-year-old gentleman with progressive right hip and groin pain due to severe osteoarthritis. Symptoms have progressed despite comprehensive conservative treatment and he presents for right total hip replacement. Risks, benefits, and alternatives of procedure were reviewed with him in detail and he desires to proceed.     HOSPITAL COURSE:  The patient underwent the aforementioned procedure on date of admission under spinal anesthesia with adductor canal block. There were no immediate postoperative complications. He was started on a multimodal pain regimen and DVT prophylaxis.     DISPOSITION: The patient made slow, steady progress with physical therapy and was appropriate for discharge to Home in stable condition on postoperative day 1. DISCHARGE MEDICATIONS:  Reinitiate preadmission medications. In addition, the patient will be on ASA for DVT prophylaxis and low dose oxycodone and Tylenol for pain. DISCHARGE INSTRUCTIONS:  Detailed printed instructions were provided to the patient. Follow up with Dr. Britni Tanner in approximately 3 weeks. The patient will receive home health physical therapy in the meantime.     Signed by: Roxy Lima PA-C  3/18/2020

## 2020-03-17 NOTE — PROGRESS NOTES
TRANSFER - IN REPORT:    Verbal report received from Cori(name) on Sabine Valenzuela  being received from PACU(unit) for routine post - op      Report consisted of patients Situation, Background, Assessment and   Recommendations(SBAR). Information from the following report(s) SBAR, Kardex, Intake/Output and MAR was reviewed with the receiving nurse. Opportunity for questions and clarification was provided. Assessment completed upon patients arrival to unit and care assumed.

## 2020-03-17 NOTE — PERIOP NOTES
TRANSFER - OUT REPORT:    Verbal report given to Janene Wong RN(name) on Joseph Goncalves  being transferred to 575(unit) for routine post - op       Report consisted of patients Situation, Background, Assessment and   Recommendations(SBAR). Time Pre op antibiotic given:0908  Anesthesia Stop time: 1104  Peñaloza Present on Transfer to floor:no  Order for Peñaloza on Chart:no  Discharge Prescriptions with Chart:yes    Information from the following report(s) SBAR, OR Summary, Procedure Summary, Intake/Output, MAR, Recent Results, Med Rec Status and Cardiac Rhythm nsr was reviewed with the receiving nurse. Opportunity for questions and clarification was provided. Is the patient on 02? YES       L/Min 2       Other nc    Is the patient on a monitor? NO    Is the nurse transporting with the patient? NO    Surgical Waiting Area notified of patient's transfer from PACU?  YES      The following personal items collected during your admission accompanied patient upon transfer:   Dental Appliance:    Vision:    Hearing Aid:    Jewelry:    Clothing: Clothing: Other (comment)(clothing bag to pacu)  Other Valuables:    Valuables sent to safe:

## 2020-03-17 NOTE — ANESTHESIA PREPROCEDURE EVALUATION
Relevant Problems   No relevant active problems       Anesthetic History   No history of anesthetic complications            Review of Systems / Medical History  Patient summary reviewed, nursing notes reviewed and pertinent labs reviewed    Pulmonary  Within defined limits                 Neuro/Psych   Within defined limits           Cardiovascular    Hypertension: well controlled                   GI/Hepatic/Renal  Within defined limits              Endo/Other  Within defined limits           Other Findings              Physical Exam    Airway  Mallampati: I  TM Distance: > 6 cm  Neck ROM: normal range of motion   Mouth opening: Normal     Cardiovascular  Regular rate and rhythm,  S1 and S2 normal,  no murmur, click, rub, or gallop             Dental  No notable dental hx       Pulmonary  Breath sounds clear to auscultation               Abdominal  GI exam deferred       Other Findings            Anesthetic Plan    ASA: 2  Anesthesia type: spinal          Induction: Intravenous  Anesthetic plan and risks discussed with: Patient

## 2020-03-18 VITALS
DIASTOLIC BLOOD PRESSURE: 82 MMHG | HEART RATE: 70 BPM | HEIGHT: 72 IN | RESPIRATION RATE: 16 BRPM | TEMPERATURE: 97.7 F | BODY MASS INDEX: 26.55 KG/M2 | WEIGHT: 196 LBS | SYSTOLIC BLOOD PRESSURE: 155 MMHG | OXYGEN SATURATION: 97 %

## 2020-03-18 LAB
ANION GAP SERPL CALC-SCNC: 7 MMOL/L (ref 5–15)
BUN SERPL-MCNC: 24 MG/DL (ref 6–20)
BUN/CREAT SERPL: 23 (ref 12–20)
CALCIUM SERPL-MCNC: 8.4 MG/DL (ref 8.5–10.1)
CHLORIDE SERPL-SCNC: 108 MMOL/L (ref 97–108)
CO2 SERPL-SCNC: 24 MMOL/L (ref 21–32)
CREAT SERPL-MCNC: 1.03 MG/DL (ref 0.7–1.3)
GLUCOSE BLD STRIP.AUTO-MCNC: 170 MG/DL (ref 65–100)
GLUCOSE SERPL-MCNC: 202 MG/DL (ref 65–100)
HGB BLD-MCNC: 11.6 G/DL (ref 12.1–17)
POTASSIUM SERPL-SCNC: 3.8 MMOL/L (ref 3.5–5.1)
SERVICE CMNT-IMP: ABNORMAL
SODIUM SERPL-SCNC: 139 MMOL/L (ref 136–145)

## 2020-03-18 PROCEDURE — 74011250636 HC RX REV CODE- 250/636: Performed by: PHYSICIAN ASSISTANT

## 2020-03-18 PROCEDURE — 97116 GAIT TRAINING THERAPY: CPT

## 2020-03-18 PROCEDURE — 97535 SELF CARE MNGMENT TRAINING: CPT

## 2020-03-18 PROCEDURE — 82962 GLUCOSE BLOOD TEST: CPT

## 2020-03-18 PROCEDURE — 80048 BASIC METABOLIC PNL TOTAL CA: CPT

## 2020-03-18 PROCEDURE — 97530 THERAPEUTIC ACTIVITIES: CPT

## 2020-03-18 PROCEDURE — 99218 HC RM OBSERVATION: CPT

## 2020-03-18 PROCEDURE — 74011250637 HC RX REV CODE- 250/637: Performed by: PHYSICIAN ASSISTANT

## 2020-03-18 PROCEDURE — 85018 HEMOGLOBIN: CPT

## 2020-03-18 PROCEDURE — 36415 COLL VENOUS BLD VENIPUNCTURE: CPT

## 2020-03-18 PROCEDURE — 97165 OT EVAL LOW COMPLEX 30 MIN: CPT

## 2020-03-18 RX ADMIN — LISINOPRIL 20 MG: 10 TABLET ORAL at 10:18

## 2020-03-18 RX ADMIN — ACETAMINOPHEN 500 MG: 500 TABLET ORAL at 10:18

## 2020-03-18 RX ADMIN — SODIUM CHLORIDE 125 ML/HR: 900 INJECTION, SOLUTION INTRAVENOUS at 06:46

## 2020-03-18 RX ADMIN — ASPIRIN 81 MG: 81 TABLET, COATED ORAL at 06:47

## 2020-03-18 RX ADMIN — AMLODIPINE BESYLATE 10 MG: 5 TABLET ORAL at 10:18

## 2020-03-18 RX ADMIN — KETOROLAC TROMETHAMINE 15 MG: 30 INJECTION, SOLUTION INTRAMUSCULAR at 06:47

## 2020-03-18 RX ADMIN — Medication 2 G: at 01:27

## 2020-03-18 RX ADMIN — TRAMADOL HYDROCHLORIDE 50 MG: 50 TABLET, FILM COATED ORAL at 06:47

## 2020-03-18 RX ADMIN — KETOROLAC TROMETHAMINE 15 MG: 30 INJECTION, SOLUTION INTRAMUSCULAR at 01:36

## 2020-03-18 NOTE — OP NOTES
[facility]  OPERATIVE REPORT    Name:  Diane Lambert  MR#:  [id number]  :  1950  ACCOUNT #:  [de-identified]  DATE OF SERVICE:  2020    PREOPERATIVE DIAGNOSIS:  Osteoarthritis of the right hip. POSTOPERATIVE DIAGNOSIS:  Osteoarthritis of the right hip. PROCEDURE PERFORMED:  Right total hip arthroplasty. SURGEON:  Mellissa Sanchez MD    FIRST ASSISTANT:  Horace Romero PA-C    ANESTHESIA:  Spinal with sedation. COMPLICATIONS:  None. SPECIMENS REMOVED:  None. IMPLANTS:  Components implanted, DonJoy 58-mm Empowr acetabular shell with one cancellous screw and 36-mm inside diameter neutral polyethylene liner, size 15 high offset TaperFill femoral stem with 36 mm +4 ceramic femoral head. ESTIMATED BLOOD LOSS:  250 mL. INDICATIONS:  The patient is a 82-year-old gentleman with progressive right hip and groin pain due to severe osteoarthritis. Symptoms have progressed despite comprehensive conservative treatment and he presents for right total hip replacement. Risks, benefits, and alternatives of procedure were reviewed with him in detail and he desires to proceed. PROCEDURE IN DETAIL:  The patient was taken to the operating room where anesthesia team placed a spinal.  Preoperative IV antibiotics were administered. He was turned to left lateral decubitus position on a Hsieh frame hip positioner. All bony prominences were well padded and an axillary roll was placed. The right hip and thigh were prepped and draped in usual sterile fashion. Through a lateral hip incision, I performed a posterior approach to the right hip. Short rotators and posterior capsule were reflected posteriorly. Leg lengths were checked on the table for comparison with trial reduction later during the procedure. Hip was dislocated and femoral neck osteotomy was made. Acetabular retractors were placed and labrum was excised.   The acetabulum was reamed with hemispherical reamers up to 57 mm before impacting a 58-mm Empowr acetabular shell. Intrinsic stability was satisfactory, but that was augmented with one cancellous screw, 36 neutral trial liner was placed. Femur was prepared with TaperFill broaches up to size 15 before achieving rotational stability. Calcar was planed. Based on native anatomy, hip was reduced with a high offset neck trial, 36 +4 head trial produced satisfactory leg length and soft tissue tension. Hip was stable to full extension and external rotation with no internal impingement, stable to straight hyperflexion, and with the hip flexed 90 degrees in neutral abduction, there was approximately 40-45 degrees of internal rotation before anterior-superior external impingement and subluxation posteriorly. Anterior superior capsule was excised as well as bony prominence in the region of anterior-inferior iliac spine and at this point, there was greater than 60-70 degrees of internal rotation of the hip. Trials were removed and wound was copiously irrigated by pulse lavage before the real components were implanted. Same leg length and stability were achieved. Periarticular soft tissues were injected with a solution containing 0.5% ropivacaine with epinephrine as well as clonidine and Toradol. Deep fascia was closed with a combination of heavy Vicryl sutures and running #2 Stratafix suture. Skin and subcutaneous layers were closed in layered fashion with Vicryl and a running Monocryl subcuticular stitch. Wound was dressed with Dermabond and Aquacel occlusive dressing. The patient's bladder was decompressed with straight catheterization before he was transported to postanesthesia care unit in stable condition. All counts were correct at the end of the procedure. The physician assistant was critical throughout the case to assist with positioning, retraction, and closure.   There were no other available residents, fellows, or surgical assistants available to assist during this procedure.       Clent Burkitt, MD      [id number]  D:  03/18/2020 7:46  T:  03/18/2020 10:10  JOB #:  [id number]  CC:  Mirtha Egan MD

## 2020-03-18 NOTE — PROGRESS NOTES
Reason for Admission:   Right total hip arthroplasty                   RUR Score:       Not available. Plan for utilizing home health:    Yes      PCP: First and Last name: Khushbu Michael    Are you a current patient: Yes/No: Yes   Approximate date of last visit: 2 weeks ago                    Current Advanced Directive/Advance Care Plan: On file in chart. Transition of Care Plan:     CM met with pt to introduce him to the CM role. This pt lives at home and was independent prior to admission. CM informed him that he has home health orders and offered him freedom of choice. He stated that he would like to use [facility] for home health. CM sent a referral to [facility] via [employer]. CORINA Jaquez,[facility]-    Observation notice provided in writing to patient and/or caregiver as well as verbal explanation of the policy. Patients who are in outpatient status also receive the Observation notice.

## 2020-03-18 NOTE — PROGRESS NOTES
I have reviewed discharge instructions with the patient. The patient verbalized understanding. Patient has viewed discharge video and is discharging home with home health.

## 2020-03-18 NOTE — PROGRESS NOTES
Bedside shift change report given to Megan Springer (oncoming nurse) by Mak Chin (offgoing nurse). Report included the following information SBAR, Kardex, Intake/Output, MAR and Recent Results.

## 2020-03-18 NOTE — PROGRESS NOTES
Orthopaedics Daily Progress Note                            Date of Surgery:  3/17/2020      Patient: Pao Davenport   YOB: 1950  Age: 78 y.o. SUBJECTIVE:   1 Day Post-Op following RIGHT TOTAL HIP ARTHROPLASTY ( SPINAL W/ IV SEDATION W/ BLOCK). The patient's post operative pain is controlled. No CP/SOB. No N/V. The patient's mobility will be evaluated today during PT sessions. OBJECTIVE:     Vital Signs:      Visit Vitals  /84 (BP 1 Location: Right arm, BP Patient Position: At rest)   Pulse 79   Temp 98 °F (36.7 °C)   Resp 16   Ht 6' (1.829 m)   Wt 88.9 kg (196 lb)   SpO2 97%   BMI 26.58 kg/m²       Physical Exam:  General: A&Ox3. The patient is cooperative, and in no acute distress. Respiratory: Respirations are unlabored. Surgical site(s): dressing clean, dry  Musculoskeletal: Calves are soft, supple, and non-tender upon palpation. Motor 5/5. Neurological:  Neurovascularly intact with good dorsi and plantar flexion.     Pulses symmetrical.    Laboratory Values:             Recent Results (from the past 12 hour(s))   GLUCOSE, POC    Collection Time: 03/17/20  9:21 PM   Result Value Ref Range    Glucose (POC) 371 (H) 65 - 100 mg/dL    Performed by Marko Foster    METABOLIC PANEL, BASIC    Collection Time: 03/18/20  1:45 AM   Result Value Ref Range    Sodium 139 136 - 145 mmol/L    Potassium 3.8 3.5 - 5.1 mmol/L    Chloride 108 97 - 108 mmol/L    CO2 24 21 - 32 mmol/L    Anion gap 7 5 - 15 mmol/L    Glucose 202 (H) 65 - 100 mg/dL    BUN 24 (H) 6 - 20 MG/DL    Creatinine 1.03 0.70 - 1.30 MG/DL    BUN/Creatinine ratio 23 (H) 12 - 20      GFR est AA >60 >60 ml/min/1.73m2    GFR est non-AA >60 >60 ml/min/1.73m2    Calcium 8.4 (L) 8.5 - 10.1 MG/DL   HEMOGLOBIN    Collection Time: 03/18/20  1:45 AM   Result Value Ref Range    HGB 11.6 (L) 12.1 - 17.0 g/dL   GLUCOSE, POC    Collection Time: 03/18/20  7:00 AM   Result Value Ref Range    Glucose (POC) 170 (H) 65 - 100 mg/dL Performed by Rusty Hill          PLAN:     S/P RIGHT TOTAL HIP ARTHROPLASTY ( SPINAL W/ IV SEDATION W/ BLOCK) -Continue WBAT. -Mobilize and continue with PT/OT until discharged     Hemodynamics Hgb today is 11.6. Acute blood loss anemia as expected. Patient asymptomatic. Continue to monitor. Wound Monitor postop dressing; no postop dressing changes necessary. Reinforce PRN. Post Operative Pain Pain Control: stable, mild-to-moderate joint symptoms intermittently, reasonably well controlled by current meds. DVT Prophylaxis Continue with SCD'S, Ankle Pump Exercises. ASA 81mg BID     Discharge Disposition Discharge plan: Home with HHPT pending PT clearance today.        Signed By: Cheryle Bounds, PA-C  March 18, 2020 8:21 AM

## 2020-03-18 NOTE — PROGRESS NOTES
Problem: Mobility Impaired (Adult and Pediatric)  Goal: *Acute Goals and Plan of Care (Insert Text)  Description: FUNCTIONAL STATUS PRIOR TO ADMISSION: Patient was independent and active without use of DME.    HOME SUPPORT PRIOR TO ADMISSION: The patient lived with sister but did not require assist.    Physical Therapy Goals  Initiated 3/17/2020    1. Patient will move from supine to sit and sit to supine  and roll side to side in bed with modified independence within 4 days. 2. Patient will perform sit to stand with modified independence within 4 days. 3. Patient will ambulate with modified independence for 150 feet with the least restrictive device within 4 days. 4. Patient will ascend/descend 4 stairs with 1 handrail(s) with modified independence within 4 days. 5. Patient will perform EDISON home exercise program per protocol with modified independence within 4 days. Outcome: Progressing Towards Goal   PHYSICAL THERAPY TREATMENT  Patient: Elena Bay (84 y.o. male)  Date: 3/18/2020  Diagnosis: Primary localized osteoarthritis of right hip [M16.11]   <principal problem not specified>  Procedure(s) (LRB):  RIGHT TOTAL HIP ARTHROPLASTY ( SPINAL W/ IV SEDATION W/ BLOCK) (Right) 1 Day Post-Op  Precautions: Fall, WBAT  Chart, physical therapy assessment, plan of care and goals were reviewed. ASSESSMENT  Patient continues with skilled PT services and is progressing towards goals. He is mobilizing well with the crutches and has very good overall pain control. He was instructed in use of axillary crutches and was able to ambulate with a step through gait pattern. After stair training, he was able to asc/desc stairs with railing and crutches. Reviewed activity recommendations and restrictions and he is clear for D/C home from a PT standpoint. RN is aware.      Current Level of Function Impacting Discharge (mobility/balance): modified independent with crutches with supervision for safety    Other factors to consider for discharge: none         PLAN :  Patient continues to benefit from skilled intervention to address the above impairments. Continue treatment per established plan of care. to address goals. Recommendation for discharge: (in order for the patient to meet his/her long term goals)  Physical therapy at least 2 days/week in the home AND ensure assist and/or supervision for safety with mobility initially     This discharge recommendation:  Has been made in collaboration with the attending provider and/or case management    IF patient discharges home will need the following DME: axillary crutches and they were issued and fit to patient       SUBJECTIVE:   Patient stated I feel pretty good, when can I start doing squats again? Artice Heal    OBJECTIVE DATA SUMMARY:   Critical Behavior:  Neurologic State: Alert  Orientation Level: Oriented X4  Cognition: Appropriate decision making, Appropriate for age attention/concentration, Appropriate safety awareness, Follows commands     Functional Mobility Training:  Bed Mobility:     Supine to Sit: Modified independent              Transfers:  Sit to Stand: Modified independent; Adaptive equipment; Additional time  Stand to Sit: Modified independent; Adaptive equipment; Additional time        Bed to Chair: Modified independent; Adaptive equipment; Additional time                    Balance:  Sitting: Intact; Without support  Standing: Intact; With support  Ambulation/Gait Training:  Distance (ft): 150 Feet (ft)  Assistive Device: Gait belt;Crutches  Ambulation - Level of Assistance: Modified independent; Adaptive equipment; Additional time        Gait Abnormalities: Antalgic;Decreased step clearance(advanced to step through gait with crutches)  Right Side Weight Bearing: As tolerated  Left Side Weight Bearing: Full  Base of Support: Shift to left  Stance: Right decreased  Speed/Jessica: Pace decreased (<100 feet/min)  Step Length: Left shortened;Right shortened  Swing Pattern: Right asymmetrical     Interventions: Safety awareness training; Tactile cues; Verbal cues; Visual/Demos           Stairs:  Number of Stairs Trained: 4  Stairs - Level of Assistance: Supervision; Adaptive equipment; Additional time(for safety)   Rail Use: Left (plus crutches)    Therapeutic Exercises: Ankle pumps, gait  Pain Rating:  No complaints of pain with activity    Activity Tolerance:   Good  Please refer to the flowsheet for vital signs taken during this treatment. After treatment patient left in no apparent distress:   Supine in bed, Call bell within reach, and Caregiver / family present    COMMUNICATION/COLLABORATION:   The patients plan of care was discussed with: Occupational therapist, Registered nurse, and Case management.      Danette Sims, PT   Time Calculation: 45 mins

## 2020-03-18 NOTE — NURSE NAVIGATOR
[address] [facility]  SBAR Orthopaedic Pathway Handoff     FROM:                                TO: At [address]                                                      ([address] [place] [address] or Facility name)  [address]  [address] [place] [address]  Dept: [address]: [phone]                                      Room#:  575/01                                                       Nurse Navigator:  Erick Fairchild RN         SITUATION      ASAScore: ASA 2 - Patient with mild systemic disease with no functional limitations    Admitted:  3/17/2020  Hospital Day: 2      Attending Provider:  Michelle Giraldo MD     Consultations:  None    PCP:  Brook De Souzama   [phone]     Admitting Dx:  Primary localized osteoarthritis of right hip [M16.11]       Active Problems:    Primary localized osteoarthritis of right hip (3/17/2020)      Type II diabetes mellitus ([facility] [address] (3/17/2020)      1 Day Post-Op of   Procedure(s):  RIGHT TOTAL HIP ARTHROPLASTY ( SPINAL W/ IV SEDATION W/ BLOCK)   BY: Michelle Giraldo MD             ON: 3/17/2020                  Code Status: Full Code             Advance Directive? 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 Taking? amLODIPine (NORVASC) 5 mg tablet 3/17/2020 at Unknown time  No Yes   Sig: Take 1 Tab by mouth daily. Patient taking differently: Take 10 mg by mouth daily.  Taking one pill BID   chlorhexidine (HIBICLENS) 4 % liquid 3/17/2020 at Unknown time  No Yes   Sig: Apply  mL to affected area daily for 7 days. ibuprofen (MOTRIN) 200 mg tablet 3/10/2020 at Unknown time  Yes Yes   Sig: Take 200 mg by mouth two (2) times daily as needed for Pain. lisinopril (PRINIVIL, ZESTRIL) 10 mg tablet 3/16/2020 at Unknown time  No Yes   Sig: Take 2 Tabs by mouth daily. Patient taking differently: Take 20 mg by mouth two (2) times a day. Indications: high blood pressure   mupirocin (BACTROBAN) 2 % ointment 3/17/2020 at Unknown time  No Yes   Sig: by Both Nostrils route two (2) times a day for 7 days. Facility-Administered Medications: None       Vaccinations: There is no immunization history on file for this patient. ASSESSMENT   Age: 78 y.o. Gender: male        Height: Height: 6' (182.9 cm)                    Weight:Weight: 88.9 kg (196 lb)     Patient Vitals for the past 8 hrs:   Temp Pulse BP   03/18/20 1007 97.7 °F (36.5 °C) 70 155/82            Active Orders   Diet    DIET DIABETIC CONSISTENT CARB Regular       Orientation: Orientation Level: Oriented X4    Active Lines/Drains:  (Peg Tube / Peñaloza / CL or S/L?):no    Urinary Status: Voiding      Last BM: Last Bowel Movement Date: 03/16/20     Skin Integrity: Incision (comment)(Right Hip)             Mobility: Slightly limited   Weight Bearing Status: WBAT (Weight Bearing as Tolerated)      Gait Training  Assistive Device: Gait belt, Crutches  Ambulation - Level of Assistance: Modified independent, Adaptive equipment, Additional time  Distance (ft): 150 Feet (ft)  Stairs - Level of Assistance: Supervision, Adaptive equipment, Additional time(for safety)  Number of Stairs Trained: 4  Rail Use: Left (plus crutches)  Interventions: Safety awareness training, Tactile cues, Verbal cues, Visual/Demos     On Anticoagulation?  YES  Aspirin  81 mg BID                                       Pain Medications given:  oxycodone Lab Results   Component Value Date/Time    Glucose 202 (H) 03/18/2020 01:45 AM    Hemoglobin A1c 7.6 (H) 03/05/2020 08:29 AM    INR 1.1 03/05/2020 08:29 AM    HGB 11.6 (L) 03/18/2020 01:45 AM    HGB 15.4 02/06/2020 01:56 PM       Readmission Risks:  Score:         RECOMMENDATION     See After Visit Summary (AVS) for:  · Discharge instructions  · After [address]   · Medication Reconciliation          [facility] Orthopaedic Nurse Navigator  NIMA Wise, RN-BC       Office  [phone]  Cell      [phone]  Fax      [phone]  [email or web address]             . Klaus

## 2020-03-18 NOTE — PROGRESS NOTES
OCCUPATIONAL THERAPY EVALUATION/DISCHARGE  Patient: Michelle Gant (82 y.o. male)  Date: 3/18/2020  Primary Diagnosis: Primary localized osteoarthritis of right hip [M16.11]  Procedure(s) (LRB):  RIGHT TOTAL HIP ARTHROPLASTY ( SPINAL W/ IV SEDATION W/ BLOCK) (Right) 1 Day Post-Op   Precautions:   Fall, WBAT(avoid sudden and extreme THR)    ASSESSMENT  Based on the objective data described below, the patient presents with great performance with self care and functional mobility following following R THR. Pt did well with all activities. Pt was able to progress with dressing and bathing activities. He was able to complete grooming activities at sink and did well with all tasks. Pt is  Independent with training and education. Pt has no further need for continued OT intervention. Current Level of Function (ADLs/self-care): supervision to mod I    Functional Outcome Measure: The patient scored 80 on the Barthel Index outcome measure which is indicative of minimal impairment. Other factors to consider for discharge: none     PLAN :  Recommend with staff: OOB to chair TID for meals. Recommend progression to and from bathroom with use of walker and gait belt for toileting. Recommendation for discharge: (in order for the patient to meet his/her long term goals)  No skilled occupational therapy/ follow up rehabilitation needs identified at this time. This discharge recommendation:  Has been made in collaboration with the attending provider and/or case management    IF patient discharges home will need the following DME: none       SUBJECTIVE:   Patient stated I am feeling great today. 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 He will have support from sister and brother at home. Expanded or extensive additional review of patient history:   Home Situation  Home Environment: Private residence  One/Two Story Residence: One story  Living Alone: No  Support Systems: Family member(s)  Patient Expects to be Discharged to[de-identified] Private residence  Current DME Used/Available at Home: None  Tub or Shower Type: Tub/Shower combination    Hand dominance: Right    EXAMINATION OF PERFORMANCE DEFICITS:  Cognitive/Behavioral Status:  Neurologic State: Alert  Orientation Level: Oriented X4  Cognition: Appropriate for age attention/concentration  Perception: Appears intact  Perseveration: No perseveration noted  Safety/Judgement: Good awareness of safety precautions    Skin: see nursing notes    Edema: none noted    Hearing: Auditory  Auditory Impairment: None    Vision/Perceptual:                           Acuity: Within Defined Limits         Range of Motion:    AROM: Within functional limits  PROM: Within functional limits                      Strength:    Strength: Within functional limits                Coordination:  Coordination: Within functional limits  Fine Motor Skills-Upper: Right Intact; Left Intact    Gross Motor Skills-Upper: Right Intact; Left Intact    Tone & Sensation:    Tone: Normal  Sensation: Intact                      Balance:  Sitting: Intact  Standing: Intact; With support    Functional Mobility and Transfers for ADLs:  Bed Mobility:  Supine to Sit: (recieved EOB)  Sit to Supine: Supervision    Transfers:  Sit to Stand: Modified independent  Stand to Sit: Modified independent  Bed to Chair: Modified independent  Bathroom Mobility: Modified independent  Toilet Transfer : Modified independent    ADL Assessment:  Feeding: Independent    Oral Facial Hygiene/Grooming: Modified Independent    Bathing: Supervision(has hand held shower and long sponge for home)    Upper Body Dressing: Supervision    Lower Body Dressing: Supervision    Toileting: Supervision                ADL Intervention and task modifications:     Lower Body Access:  Pt with anterior hip replacement and instructed to avoid extreme movements and allow pain to be the guide to complete lower body access. Recommend use of hip kit to complete lower body dressing to maximize independence and assist with pain control. Pt provided with education for proper  to access lower body with trunk flexion. Pt instructed with trunk flexion, both elbows and hands should reach between knees with hips externally rotated. Pt reports independence with training and education. Pt completed lower body dressing with supervision for pants, supervision for underpants, socks with supervision , and supervision for shoes. IADL training:   Discussed at length precautions with IADL tasks. Discussed body alignment and ensuring pt does not twist hips/knees to ensure proper body alignment. Discussed finger tip rule for daily activities and to use a reacher for all tasks that are out of reach. Pt discussed to avoid tasks such as sweeping, mopping, vacuuming, changing bed linens, carrying a laundry basket, reaching into a low oven, or cleaning showers and toilets. Pt verbalized understanding of instructions. Did encourage pt to stand at sink for grooming, washing dishes, and light meal preparations to increase overall standing tolerance and independence with all activities. Cognitive Retraining  Safety/Judgement: Good awareness of safety precautions    Functional Measure:  Barthel Index:    Bathin  Bladder: 10  Bowels: 10  Groomin  Dressin  Feeding: 10  Mobility: 15  Stairs: 10  Toilet Use: 5  Transfer (Bed to Chair and Back): 10  Total: 80/100        The Barthel ADL Index: Guidelines  1.  The index should be used as a record of what a patient does, not as a record of what a patient could do. 2. The main aim is to establish degree of independence from any help, physical or verbal, however minor and for whatever reason. 3. The need for supervision renders the patient not independent. 4. A patient's performance should be established using the best available evidence. Asking the patient, friends/relatives and nurses are the usual sources, but direct observation and common sense are also important. However direct testing is not needed. 5. Usually the patient's performance over the preceding 24-48 hours is important, but occasionally longer periods will be relevant. 6. Middle categories imply that the patient supplies over 50 per cent of the effort. 7. Use of aids to be independent is allowed. Srinivasa Ward., Barthel, D.W. (4327). Functional evaluation: the Barthel Index. [address] [facility] (14)2. Kamran Lima marilyn SUNSHINE Torrez, Chris Melton., Ethel Azar., [place], [facility] (1999). Measuring the change indisability after inpatient rehabilitation; comparison of the responsiveness of the Barthel Index and Functional [place] Measure. Journal of Neurology, Neurosurgery, and Psychiatry, 66(4), 610-352. Darya Cunningham, N.J.A, JATIN Jordan, & Kiersten Lopez, M.A. (2004.) Assessment of post-stroke quality of life in cost-effectiveness studies: The usefulness of the Barthel Index and the EuroQoL-5D.  Quality of Life Research, 15, 203-89       Occupational Therapy Evaluation Charge Determination   History Examination Decision-Making   LOW Complexity : Brief history review  LOW Complexity : 1-3 performance deficits relating to physical, cognitive , or psychosocial skils that result in activity limitations and / or participation restrictions  LOW Complexity : No comorbidities that affect functional and no verbal or physical assistance needed to complete eval tasks       Based on the above components, the patient evaluation is determined to be of the following complexity level: LOW   Pain Rating:  No pain    Activity Tolerance:   Good  Please refer to the flowsheet for vital signs taken during this treatment.     After treatment patient left in no apparent distress:    Supine in bed and Call bell within reach    COMMUNICATION/EDUCATION:   The patients plan of care was discussed with: Physical therapist.     Thank you for this referral.  Sosa Alcala, OT  Time Calculation: 20 mins

## 2020-03-18 NOTE — PROGRESS NOTES
Bedside shift change report given to Yordan Tompkins (oncoming nurse) by Christina Sherman (offgoing nurse). Report included the following information SBAR, Kardex, Intake/Output and MAR.

## 2020-03-18 NOTE — PROGRESS NOTES
CM noted that [facility] has accepted this pt for home health.  CM placed this information on pt's AVS. DAYSI RuckerW,ACM-SW

## 2020-03-18 NOTE — PROGRESS NOTES
Patient is a newly diagnosed diabetic, according to his chart. Patient denies diagnosis. Patient's blood sugar is 371. No insulin orders placed for this patient. Paged Dr. Tahir Watts, who ordered standard sliding scale orders for this patient.

## 2020-03-19 ENCOUNTER — PATIENT OUTREACH (OUTPATIENT)
Dept: CASE MANAGEMENT | Age: 70
End: 2020-03-19

## 2020-03-19 ENCOUNTER — PATIENT OUTREACH (OUTPATIENT)
Dept: CARDIOLOGY CLINIC | Age: 70
End: 2020-03-19

## 2020-03-19 NOTE — PROGRESS NOTES
This note will not be viewable in [address] Post Discharge Follow-up contact after Joint Replacement    Patient discharged on 3/18/20  By  Tona Phillips   following  right hip Arthroplasty. Spoke with patient today, who reports they are \" feeling pretty good. \"  Denies Fever, Shortness of Breath or Chest Pain. Home Health has visited. Patient also reports:  Aquacel dressing clean, dry, intact  Calf is non-tender, operative extremity has minimal swelling. Pain is well managed. Discussed use of ice & elevation. Patient is progressing with therapy and is exercising independently. Taking Aspirin for anticoagulation, Tylenol for pain. Patient is not experiencing symptoms of constipation & urinating without difficulty. Discussed side effects of anticoagulants & pain medications (bleeding/bruising, constipation, lightheaded/dizziness)  Follow up appointment is not scheduled; but patient states plan to schedule. Discussed calling surgeon Dr Kayla Rosas  for drainage, bleeding, swelling in operative extremity, fever or pain. Discussed calling PCP Dr Sacha Reyes with other medical issues.

## 2020-03-19 NOTE — PROGRESS NOTES
COVID-19 Screening Initial Follow-up Note    Patient contacted regarding COVID-19  risk. Care Transition Nurse/ Ambulatory Care Manager contacted the patient by telephone to perform post discharge assessment. Verified name and  with patient as identifiers. Provided introduction to self, and explanation of the CTN/ACM role, and reason for call due to risk factors for infection and/or exposure to COVID-19. Symptoms reviewed with patient who verbalized the following symptoms:   Fever no    Fatigue no   Pain or aching joints yes Patient s/p hip surgery--pain in R hip  Cough no  Shortness of breath no    Confusion or unusual change in mental status no    Chills or shaking no    Sweating no    Fast heart rate no    Fast breathing no    Dizziness/lightheadedness no    Less urine output no    Cold, clammy, and pale skin no  Low body temperature no       Due to onset/worsening of new symptoms, encounter routed to provider for escalation. Patient has following risk factors of: s/p hip surgery CTN/ACM reviewed discharge instructions, medical action plan and red flags such as increased shortness of breath, increasing fever and signs of decompensation with patient who verbalized understanding. Discussed exposure protocols and quarantine with CDC Guidelines What to do if you are sick with coronavirus disease 2019 Patient who was given an opportunity for questions and concerns. The patient agrees to contact the [facility] exposure line, local health department and PCP office for questions related to their healthcare. CTN provided contact information for future reference.     Reviewed and educated patient on any new and changed medications related to discharge diagnosis     Plan for follow-up call in 14 days based on severity of symptoms and risk factors

## 2020-04-02 ENCOUNTER — PATIENT OUTREACH (OUTPATIENT)
Dept: CARDIOLOGY CLINIC | Age: 70
End: 2020-04-02

## 2020-04-02 NOTE — PROGRESS NOTES
Patient resolved from Transition of Care episode on 4/2/20  Patient/family has been provided the following resources and education related to COVID-19:                         Signs, symptoms and red flags related to COVID-19            CDC exposure and quarantine guidelines            [facility] exposure contact - [phone]            Contact for their local Department of Health                 Patient currently reports that the following symptoms have improved:  no new/worsening symptoms  Patient denies any respiratory symptoms, fever, SOB or CP at this time. States he has one more in home PT session with [facility] and see yinka Linder on Monday 4/6 for follow up. No further outreach scheduled with this CTN/ACM. Episode of Care resolved. Patient has this CTN/ACM contact information if future needs arise.

## 2020-05-07 PROBLEM — E11.59 HYPERTENSION COMPLICATING DIABETES (HCC): Status: ACTIVE | Noted: 2020-05-07

## 2020-05-07 PROBLEM — Z22.321 MSSA (METHICILLIN-SUSCEPTIBLE STAPH AUREUS) CARRIER: Status: RESOLVED | Noted: 2020-03-09 | Resolved: 2020-05-07

## 2020-05-07 PROBLEM — R73.09 ELEVATED HEMOGLOBIN A1C: Status: RESOLVED | Noted: 2020-03-09 | Resolved: 2020-05-07

## 2020-05-07 PROBLEM — I15.2 HYPERTENSION COMPLICATING DIABETES (HCC): Status: ACTIVE | Noted: 2020-05-07

## 2020-09-08 ENCOUNTER — HOSPITAL ENCOUNTER (OUTPATIENT)
Dept: GENERAL RADIOLOGY | Age: 70
Discharge: HOME | End: 2020-09-08
Attending: INTERNAL MEDICINE
Payer: MEDICARE

## 2020-09-08 DIAGNOSIS — C61 PROSTATE CA (HCC): ICD-10-CM

## 2020-09-08 PROCEDURE — 71046 X-RAY EXAM CHEST 2 VIEWS: CPT

## 2021-01-14 PROBLEM — R07.89 OTHER CHEST PAIN: Status: ACTIVE | Noted: 2021-01-14

## 2021-01-14 PROBLEM — M25.512 LEFT SHOULDER PAIN: Status: ACTIVE | Noted: 2021-01-14

## 2021-01-14 PROBLEM — M25.512 LEFT SHOULDER PAIN: Status: RESOLVED | Noted: 2021-01-14 | Resolved: 2021-01-14

## 2021-02-19 ENCOUNTER — TRANSCRIBE ORDER (OUTPATIENT)
Dept: SCHEDULING | Age: 71
End: 2021-02-19

## 2021-02-19 DIAGNOSIS — M19.012 OSTEOARTHRITIS OF LEFT SHOULDER: Primary | ICD-10-CM

## 2021-02-25 ENCOUNTER — HOSPITAL ENCOUNTER (OUTPATIENT)
Dept: CT IMAGING | Age: 71
Discharge: HOME | End: 2021-02-25
Attending: ORTHOPAEDIC SURGERY
Payer: MEDICARE

## 2021-02-25 ENCOUNTER — TRANSCRIBE ORDER (OUTPATIENT)
Dept: REGISTRATION | Age: 71
End: 2021-02-25

## 2021-02-25 DIAGNOSIS — Z01.812 PRE-PROCEDURE LAB EXAM: Primary | ICD-10-CM

## 2021-02-25 DIAGNOSIS — M19.012 OSTEOARTHRITIS OF LEFT SHOULDER: ICD-10-CM

## 2021-02-25 PROCEDURE — 73200 CT UPPER EXTREMITY W/O DYE: CPT

## 2021-03-02 ENCOUNTER — HOSPITAL ENCOUNTER (OUTPATIENT)
Dept: PREADMISSION TESTING | Age: 71
Discharge: HOME | End: 2021-03-02
Payer: MEDICARE

## 2021-03-02 VITALS
SYSTOLIC BLOOD PRESSURE: 137 MMHG | DIASTOLIC BLOOD PRESSURE: 78 MMHG | HEIGHT: 71 IN | HEART RATE: 69 BPM | TEMPERATURE: 97.5 F | WEIGHT: 197.75 LBS | BODY MASS INDEX: 27.69 KG/M2

## 2021-03-02 LAB
ABO + RH BLD: NORMAL
ANION GAP SERPL CALC-SCNC: 5 MMOL/L (ref 5–15)
APPEARANCE UR: CLEAR
BACTERIA URNS QL MICRO: NEGATIVE /HPF
BILIRUB UR QL: NEGATIVE
BLOOD GROUP ANTIBODIES SERPL: NORMAL
BUN SERPL-MCNC: 20 MG/DL (ref 6–20)
BUN/CREAT SERPL: 20 (ref 12–20)
CALCIUM SERPL-MCNC: 8.4 MG/DL (ref 8.5–10.1)
CHLORIDE SERPL-SCNC: 107 MMOL/L (ref 97–108)
CO2 SERPL-SCNC: 26 MMOL/L (ref 21–32)
COLOR UR: ABNORMAL
CREAT SERPL-MCNC: 1.02 MG/DL (ref 0.7–1.3)
EPITH CASTS URNS QL MICRO: ABNORMAL /LPF
ERYTHROCYTE [DISTWIDTH] IN BLOOD BY AUTOMATED COUNT: 11.9 % (ref 11.5–14.5)
EST. AVERAGE GLUCOSE BLD GHB EST-MCNC: 154 MG/DL
GLUCOSE SERPL-MCNC: 238 MG/DL (ref 65–100)
GLUCOSE UR STRIP.AUTO-MCNC: >1000 MG/DL
HBA1C MFR BLD: 7 % (ref 4–5.6)
HCT VFR BLD AUTO: 41 % (ref 36.6–50.3)
HGB BLD-MCNC: 13.8 G/DL (ref 12.1–17)
HGB UR QL STRIP: NEGATIVE
HYALINE CASTS URNS QL MICRO: ABNORMAL /LPF (ref 0–5)
INR PPP: 1 (ref 0.9–1.1)
KETONES UR QL STRIP.AUTO: NEGATIVE MG/DL
LEUKOCYTE ESTERASE UR QL STRIP.AUTO: NEGATIVE
MCH RBC QN AUTO: 30.9 PG (ref 26–34)
MCHC RBC AUTO-ENTMCNC: 33.7 G/DL (ref 30–36.5)
MCV RBC AUTO: 91.7 FL (ref 80–99)
NITRITE UR QL STRIP.AUTO: NEGATIVE
NRBC # BLD: 0 K/UL (ref 0–0.01)
NRBC BLD-RTO: 0 PER 100 WBC
PH UR STRIP: 5 [PH] (ref 5–8)
PLATELET # BLD AUTO: 323 K/UL (ref 150–400)
PMV BLD AUTO: 9.1 FL (ref 8.9–12.9)
POTASSIUM SERPL-SCNC: 3.9 MMOL/L (ref 3.5–5.1)
PROT UR STRIP-MCNC: NEGATIVE MG/DL
PROTHROMBIN TIME: 10.8 SEC (ref 9–11.1)
RBC # BLD AUTO: 4.47 M/UL (ref 4.1–5.7)
RBC #/AREA URNS HPF: ABNORMAL /HPF (ref 0–5)
SODIUM SERPL-SCNC: 138 MMOL/L (ref 136–145)
SP GR UR REFRACTOMETRY: 1.03 (ref 1–1.03)
SPECIMEN EXP DATE BLD: NORMAL
UA: UC IF INDICATED,UAUC: ABNORMAL
UROBILINOGEN UR QL STRIP.AUTO: 0.2 EU/DL (ref 0.2–1)
WBC # BLD AUTO: 6 K/UL (ref 4.1–11.1)
WBC URNS QL MICRO: ABNORMAL /HPF (ref 0–4)

## 2021-03-02 PROCEDURE — 36415 COLL VENOUS BLD VENIPUNCTURE: CPT

## 2021-03-02 PROCEDURE — 81001 URINALYSIS AUTO W/SCOPE: CPT

## 2021-03-02 PROCEDURE — 86901 BLOOD TYPING SEROLOGIC RH(D): CPT

## 2021-03-02 PROCEDURE — 83036 HEMOGLOBIN GLYCOSYLATED A1C: CPT

## 2021-03-02 PROCEDURE — 85610 PROTHROMBIN TIME: CPT

## 2021-03-02 PROCEDURE — 85027 COMPLETE CBC AUTOMATED: CPT

## 2021-03-02 PROCEDURE — 80048 BASIC METABOLIC PNL TOTAL CA: CPT

## 2021-03-02 RX ORDER — CHOLECALCIFEROL (VITAMIN D3) 125 MCG
CAPSULE ORAL DAILY
COMMUNITY

## 2021-03-02 RX ORDER — HYDRALAZINE HYDROCHLORIDE 25 MG/1
25 TABLET, FILM COATED ORAL 2 TIMES DAILY
COMMUNITY
End: 2021-09-14

## 2021-03-02 RX ORDER — ATORVASTATIN CALCIUM 40 MG/1
40 TABLET, FILM COATED ORAL DAILY
COMMUNITY
End: 2022-09-12 | Stop reason: SDUPTHER

## 2021-03-02 RX ORDER — LORATADINE 10 MG/1
10 TABLET ORAL DAILY
COMMUNITY

## 2021-03-02 NOTE — PERIOP NOTES
PREOPERATIVE INSTRUCTIONS REVIEWED WITH PATIENT. PATIENT GIVEN TWO--BOTTLES OF CHG SOAPS  INSTRUCTIONS REVIEWED ON USE OF CHG SOAPS  PATIENT GIVEN SSI INFECTIONS SHEET; MRSA/MSSA TREATMENT INSTRUCTION SHEET GIVEN WITH AN EXPLANATION TO PATIENT THAT THEY WILL BE NOTIFIED IF TREATMENT INSTRUCTIONS NEED TO BE INITIATED. PATIENT WAS GIVEN THE OPPORTUNITY TO ASK QUESTIONS; REGARDING THE INFORMATION PROVIDED. PREOP DIET AND NUTRITION UPDATED GUIDELINES/ INSTRUCTIONS PROVIDED     PT  MADE AWARE OF COVID 19 TESTING NEEDED TO BE DONE WITHIN 96 HOURS OF SURGERY. PT INSTRUCTED ON SELF QUARANTINE  BETWEEN TESTING AND ARRIVAL TIME  ON DAY OF SURGERY. INSTRUCTION PROVIDED FOR CELL PHONE WAITING AREA, PT INFORMATION AND INSTRUCTIONS FOR APPOINTMENTS AT [facility] ON DAY OF SURGERY.

## 2021-03-03 NOTE — PERIOP NOTES
LABS FAXED VIA CC TO SURGEON'S OFFICE FOR REVIEW, EKG AND CARDIAC NOTES FAXED TO OFFICE FOR REVIEW     MRSA STILL PENDING AT THIS TIME     CHART GIVEN TO JAMIA VELASCO NP FOR REVIEW     CHART RECEIVED BACK FROM JAMIA VELASCO NP REVIEW HER NOTES,

## 2021-03-03 NOTE — PERIOP NOTES
PAT Nurse Practitioner   Pre-Operative Chart Review/Assessment:-ORTHOPEDIC                Patient Name:  Josephine Skelton                                                           Age:   73 y.o.    :  1950     Today's Date:  3/8/2021     Date of PAT:   3/2/2021      Date of Surgery:    3/11/2021      Procedure(s):  Left Total Shoulder Arthroplasty     Surgeon:   Dr. Martha Zamora                       PLAN:      1)  Medical Clearance:  Dr. Alfonso Watts (Appt 3/4)      2)  Cardiac Clearance: Followed by Dr. Facundo Andersen. LOV 21. Clearance obtained. Per MD, pt may hold asa 5-7 days PTS if needed. OV note and EKG on chart. 3)  Diabetic Treatment Consult:  Not indicated. A1c-7.0. Known DM. 4)  Sleep Apnea evaluation:   Not indicated. TANMAY Score 3.       5) Treatment for MRSA/Staph Aureus:  + MSSA. Treated by surgeon.       6) Additional Concerns:  HTN, T2DM, CAD                Vital Signs:         Vitals:    21 1513   BP: 137/78   Pulse: 69   Temp: 97.5 °F (36.4 °C)   Weight: 89.7 kg (197 lb 12 oz)   Height: 5' 11.25\" (1.81 m)            ____________________________________________  PAST MEDICAL HISTORY  Past Medical History:   Diagnosis Date    Arthritis     Basal cell carcinoma 2021    FACE     Basal cell carcinoma (BCC) of chest 2019    LEFT SIDE     CAD (coronary artery disease)     Chronic pain     DM (diabetes mellitus) (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 [address]     active surveillance      ____________________________________________  PAST SURGICAL HISTORY  Past Surgical History:   Procedure Laterality Date    HX GI      COLONOSCOPY    HX HEENT      WISDOM TEETH     HX HEENT      BCCA REMOVED FROM FACE     HX HIP REPLACEMENT Right     HX OTHER SURGICAL  2020    BCCA REMOVED FROM LEFT CHEST     HX POLYPECTOMY      LA CARDIAC SURG PROCEDURE UNLIST 02/2021    CARDIAC CATHETERIZATION     VT PROSTATE BIOPSY, NEEDLE, SATURATION SAMPLING  2020    VT TOTAL HIP ARTHROPLASTY Right 03/17/2020      ____________________________________________  HOME MEDICATIONS  Current Outpatient Medications   Medication Sig    hydrALAZINE (APRESOLINE) 25 mg tablet Take 25 mg by mouth two (2) times a day.  atorvastatin (LIPITOR) 40 mg tablet Take 40 mg by mouth daily.  loratadine (CLARITIN) 10 mg tablet Take 10 mg by mouth daily.  cholecalciferol, vitamin D3, (Vitamin D3) 50 mcg (2,000 unit) tab Take  by mouth daily.  amoxicillin (AMOXIL) 500 mg capsule TAKE 4 CAPSULES BY MOUTH ONCE FOR ONE DOSE 1 HOUR BEFORE DENTAL WORK    tadalafiL (Cialis) 5 mg tablet Take 1 Tab by mouth daily.  lisinopriL (PRINIVIL, ZESTRIL) 20 mg tablet Take 20 mg by mouth two (2) times a day.  amLODIPine (NORVASC) 5 mg tablet Take 5 mg by mouth two (2) times a day.  aspirin delayed-release 81 mg tablet Take 1 Tab by mouth two (2) times a day. Indications: blood clot prevention (Patient taking differently: Take 81 mg by mouth daily. Indications: blood clot prevention)     No current facility-administered medications for this encounter.       ____________________________________________  ALLERGIES  Allergies   Allergen Reactions    Codeine Nausea and Vomiting      ____________________________________________  SOCIAL HISTORY  Social History     Tobacco Use    Smoking status: Never Smoker    Smokeless tobacco: Never Used   Substance Use Topics    Alcohol use:  Yes     Alcohol/week: 7.0 standard drinks     Types: 7 Shots of liquor per week      ____________________________________________        Labs:     Hospital Outpatient Visit on 03/02/2021   Component Date Value Ref Range Status    Sodium 03/02/2021 138  136 - 145 mmol/L Final    Potassium 03/02/2021 3.9  3.5 - 5.1 mmol/L Final    Chloride 03/02/2021 107  97 - 108 mmol/L Final    CO2 03/02/2021 26  21 - 32 mmol/L Final    Anion gap 03/02/2021 5  5 - 15 mmol/L Final    Glucose 03/02/2021 238* 65 - 100 mg/dL Final    BUN 03/02/2021 20  6 - 20 MG/DL Final    Creatinine 03/02/2021 1.02  0.70 - 1.30 MG/DL Final    BUN/Creatinine ratio 03/02/2021 20  12 - 20   Final    GFR est AA 03/02/2021 >60  >60 ml/min/1.73m2 Final    GFR est non-AA 03/02/2021 >60  >60 ml/min/1.73m2 Final    Estimated GFR is calculated using the IDMS-traceable Modification of Diet in Renal Disease (MDRD) Study equation, reported for both  Americans (GFRAA) and non- Americans (GFRNA), and normalized to 1.73m2 body surface area. The physician must decide which value applies to the patient.  Calcium 03/02/2021 8.4* 8.5 - 10.1 MG/DL Final    WBC 03/02/2021 6.0  4.1 - 11.1 K/uL Final    RBC 03/02/2021 4.47  4.10 - 5.70 M/uL Final    HGB 03/02/2021 13.8  12.1 - 17.0 g/dL Final    HCT 03/02/2021 41.0  36.6 - 50.3 % Final    MCV 03/02/2021 91.7  80.0 - 99.0 FL Final    MCH 03/02/2021 30.9  26.0 - 34.0 PG Final    MCHC 03/02/2021 33.7  30.0 - 36.5 g/dL Final    RDW 03/02/2021 11.9  11.5 - 14.5 % Final    PLATELET 22/78/6691 460  150 - 400 K/uL Final    MPV 03/02/2021 9.1  8.9 - 12.9 FL Final    NRBC 03/02/2021 0.0  0  WBC Final    ABSOLUTE NRBC 03/02/2021 0.00  0.00 - 0.01 K/uL Final    Crossmatch Expiration 03/02/2021 03/14/2021,2359   Final    ABO/Rh(D) 03/02/2021 O POSITIVE   Final    Antibody screen 03/02/2021 NEG   Final    INR 03/02/2021 1.0  0.9 - 1.1   Final    A single therapeutic range for Vit K antagonists may not be optimal for all indications - see June, 2008 issue of Chest, American College of Chest Physicians Evidence-Based Clinical Practice Guidelines, 8th Edition.     Prothrombin time 03/02/2021 10.8  9.0 - 11.1 sec Final    Color 03/02/2021 YELLOW/STRAW    Final    Color Reference Range: Straw, Yellow or Dark Yellow    Appearance 03/02/2021 CLEAR  CLEAR   Final    Specific gravity 03/02/2021 1.027  1.003 - 1.030 Final    pH (UA) 03/02/2021 5.0  5.0 - 8.0   Final    Protein 03/02/2021 Negative  NEG mg/dL Final    Glucose 03/02/2021 >1,000* NEG mg/dL Final    Ketone 03/02/2021 Negative  NEG mg/dL Final    Bilirubin 03/02/2021 Negative  NEG   Final    Blood 03/02/2021 Negative  NEG   Final    Urobilinogen 03/02/2021 0.2  0.2 - 1.0 EU/dL Final    Nitrites 03/02/2021 Negative  NEG   Final    Leukocyte Esterase 03/02/2021 Negative  NEG   Final    UA:UC IF INDICATED 03/02/2021 CULTURE NOT INDICATED BY UA RESULT  CNI   Final    WBC 03/02/2021 0-4  0 - 4 /hpf Final    RBC 03/02/2021 0-5  0 - 5 /hpf Final    Epithelial cells 03/02/2021 FEW  FEW /lpf Final    Epithelial cell category consists of squamous cells and /or transitional urothelial cells. Renal tubular cells, if present, are separately identified as such.  Bacteria 03/02/2021 Negative  NEG /hpf Final    Hyaline cast 03/02/2021 0-2  0 - 5 /lpf Final    Hemoglobin A1c 03/02/2021 7.0* 4.0 - 5.6 % Final    Comment: NEW METHOD  PLEASE NOTE NEW REFERENCE RANGE  (NOTE)  HbA1C Interpretive Ranges  <5.7              Normal  5.7 - 6.4         Consider Prediabetes  >6.5              Consider Diabetes      Est. average glucose 03/02/2021 154  mg/dL Final    Special Requests: 03/02/2021 NO SPECIAL REQUESTS    Final    Culture result: 03/02/2021 MRSA NOT PRESENT. Apparent Staphylococus aureus (not MRSA noted). Final       Skin:     Denies open wounds, cuts, sores, rashes or other areas of concern in PAT assessment.           Alicia Simon NP

## 2021-03-04 LAB
BACTERIA SPEC CULT: NORMAL
BACTERIA SPEC CULT: NORMAL
SERVICE CMNT-IMP: NORMAL

## 2021-03-04 NOTE — PERIOP NOTES
CALLED 'S OFFICE AND SPOKE TO [address] [place] [address]  ABOUT ABNORMAL  LAB- MSSA,  NOTIFIED THAT PAT NP IS ON VACATION;OFFICE HAVE TO TREAT PT.RESULT  HAVE BEEN FAXED OVER TO OFFICE VIA CC.

## 2021-03-07 ENCOUNTER — HOSPITAL ENCOUNTER (OUTPATIENT)
Dept: PREADMISSION TESTING | Age: 71
Discharge: HOME | End: 2021-03-07
Payer: MEDICARE

## 2021-03-07 DIAGNOSIS — Z01.812 PRE-PROCEDURE LAB EXAM: ICD-10-CM

## 2021-03-07 PROCEDURE — U0003 INFECTIOUS AGENT DETECTION BY NUCLEIC ACID (DNA OR RNA); SEVERE ACUTE RESPIRATORY SYNDROME CORONAVIRUS 2 (SARS-COV-2) (CORONAVIRUS DISEASE [COVID-19]), AMPLIFIED PROBE TECHNIQUE, MAKING USE OF HIGH THROUGHPUT TECHNOLOGIES AS DESCRIBED BY CMS-2020-01-R: HCPCS

## 2021-03-08 LAB — SARS-COV-2, COV2NT: NOT DETECTED

## 2021-03-11 ENCOUNTER — ANESTHESIA EVENT (OUTPATIENT)
Dept: SURGERY | Age: 71
End: 2021-03-11
Payer: MEDICARE

## 2021-03-11 ENCOUNTER — HOSPITAL ENCOUNTER (OUTPATIENT)
Age: 71
LOS: 1 days | Discharge: HOME | End: 2021-03-12
Attending: ORTHOPAEDIC SURGERY | Admitting: ORTHOPAEDIC SURGERY
Payer: MEDICARE

## 2021-03-11 ENCOUNTER — ANESTHESIA (OUTPATIENT)
Dept: SURGERY | Age: 71
End: 2021-03-11
Payer: MEDICARE

## 2021-03-11 DIAGNOSIS — M19.012 PRIMARY OSTEOARTHRITIS OF LEFT SHOULDER: Primary | ICD-10-CM

## 2021-03-11 PROBLEM — M19.019 OA (OSTEOARTHRITIS) OF SHOULDER: Status: ACTIVE | Noted: 2021-03-11

## 2021-03-11 LAB
GLUCOSE BLD STRIP.AUTO-MCNC: 139 MG/DL (ref 65–100)
GLUCOSE BLD STRIP.AUTO-MCNC: 182 MG/DL (ref 65–100)
SERVICE CMNT-IMP: ABNORMAL
SERVICE CMNT-IMP: ABNORMAL

## 2021-03-11 PROCEDURE — 74011250637 HC RX REV CODE- 250/637: Performed by: ANESTHESIOLOGY

## 2021-03-11 PROCEDURE — 77030036638 HC ACC KT GPS KNE V2 EXAC -D: Performed by: ORTHOPAEDIC SURGERY

## 2021-03-11 PROCEDURE — 74011000250 HC RX REV CODE- 250: Performed by: ORTHOPAEDIC SURGERY

## 2021-03-11 PROCEDURE — 77030036560 HC SHLDR ARM PAD ABDUCTN S2SG -B: Performed by: ORTHOPAEDIC SURGERY

## 2021-03-11 PROCEDURE — 74011250636 HC RX REV CODE- 250/636: Performed by: ANESTHESIOLOGY

## 2021-03-11 PROCEDURE — 74011000250 HC RX REV CODE- 250: Performed by: NURSE ANESTHETIST, CERTIFIED REGISTERED

## 2021-03-11 PROCEDURE — 2709999900 HC NON-CHARGEABLE SUPPLY: Performed by: ORTHOPAEDIC SURGERY

## 2021-03-11 PROCEDURE — 77030006835 HC BLD SAW SAG STRY -B: Performed by: ORTHOPAEDIC SURGERY

## 2021-03-11 PROCEDURE — 76060000037 HC ANESTHESIA 3 TO 3.5 HR: Performed by: ORTHOPAEDIC SURGERY

## 2021-03-11 PROCEDURE — 74011250636 HC RX REV CODE- 250/636: Performed by: NURSE ANESTHETIST, CERTIFIED REGISTERED

## 2021-03-11 PROCEDURE — 77030019908 HC STETH ESOPH SIMS -A: Performed by: ANESTHESIOLOGY

## 2021-03-11 PROCEDURE — 77030027138 HC INCENT SPIROMETER -A

## 2021-03-11 PROCEDURE — 77030002933 HC SUT MCRYL J&J -A: Performed by: ORTHOPAEDIC SURGERY

## 2021-03-11 PROCEDURE — C1713 ANCHOR/SCREW BN/BN,TIS/BN: HCPCS | Performed by: ORTHOPAEDIC SURGERY

## 2021-03-11 PROCEDURE — 2709999900 HC NON-CHARGEABLE SUPPLY

## 2021-03-11 PROCEDURE — 82962 GLUCOSE BLOOD TEST: CPT

## 2021-03-11 PROCEDURE — 77030031139 HC SUT VCRL2 J&J -A: Performed by: ORTHOPAEDIC SURGERY

## 2021-03-11 PROCEDURE — 74011250637 HC RX REV CODE- 250/637: Performed by: ORTHOPAEDIC SURGERY

## 2021-03-11 PROCEDURE — 74011250636 HC RX REV CODE- 250/636: Performed by: ORTHOPAEDIC SURGERY

## 2021-03-11 PROCEDURE — 77030026438 HC STYL ET INTUB CARD -A: Performed by: ANESTHESIOLOGY

## 2021-03-11 PROCEDURE — C1776 JOINT DEVICE (IMPLANTABLE): HCPCS | Performed by: ORTHOPAEDIC SURGERY

## 2021-03-11 PROCEDURE — 77030002922 HC SUT FBRWRE ARTH -B: Performed by: ORTHOPAEDIC SURGERY

## 2021-03-11 PROCEDURE — 77030008684 HC TU ET CUF COVD -B: Performed by: ANESTHESIOLOGY

## 2021-03-11 PROCEDURE — 77030032497 HC WRP SHLDR WO BGS SOLM -B

## 2021-03-11 PROCEDURE — 76210000000 HC OR PH I REC 2 TO 2.5 HR: Performed by: ORTHOPAEDIC SURGERY

## 2021-03-11 PROCEDURE — 77030002912 HC SUT ETHBND J&J -A: Performed by: ORTHOPAEDIC SURGERY

## 2021-03-11 PROCEDURE — 77030010396 HC WRE FIX C CNMD -A: Performed by: ORTHOPAEDIC SURGERY

## 2021-03-11 PROCEDURE — 77030018673: Performed by: ORTHOPAEDIC SURGERY

## 2021-03-11 PROCEDURE — 77030041680 HC PNCL ELECSURG SMK EVAC CNMD -B: Performed by: ORTHOPAEDIC SURGERY

## 2021-03-11 PROCEDURE — 76010000173 HC OR TIME 3 TO 3.5 HR INTENSV-TIER 1: Performed by: ORTHOPAEDIC SURGERY

## 2021-03-11 PROCEDURE — 77030040361 HC SLV COMPR DVT MDII -B: Performed by: ORTHOPAEDIC SURGERY

## 2021-03-11 DEVICE — CEMENT BONE 70GM FULL DOSE CA PHOS W/ GENTMYCN HI VISC N: Type: IMPLANTABLE DEVICE | Site: SHOULDER | Status: FUNCTIONAL

## 2021-03-11 DEVICE — SHOULDER S1 TOT STD ANAT -- IMPL CAPPED S1: Type: IMPLANTABLE DEVICE | Status: FUNCTIONAL

## 2021-03-11 DEVICE — IMPLANTABLE DEVICE
Type: IMPLANTABLE DEVICE | Site: SHOULDER | Status: FUNCTIONAL
Brand: EQUINOXE

## 2021-03-11 DEVICE — PLATE BNE 4.5MM ANAT REPLICATOR O/S EQUINOXE: Type: IMPLANTABLE DEVICE | Site: SHOULDER | Status: FUNCTIONAL

## 2021-03-11 RX ORDER — SODIUM CHLORIDE 0.9 % (FLUSH) 0.9 %
5-40 SYRINGE (ML) INJECTION AS NEEDED
Status: DISCONTINUED | OUTPATIENT
Start: 2021-03-11 | End: 2021-03-11 | Stop reason: HOSPADM

## 2021-03-11 RX ORDER — MIDAZOLAM HYDROCHLORIDE 1 MG/ML
1 INJECTION, SOLUTION INTRAMUSCULAR; INTRAVENOUS AS NEEDED
Status: DISCONTINUED | OUTPATIENT
Start: 2021-03-11 | End: 2021-03-11 | Stop reason: HOSPADM

## 2021-03-11 RX ORDER — SODIUM CHLORIDE, SODIUM LACTATE, POTASSIUM CHLORIDE, CALCIUM CHLORIDE 600; 310; 30; 20 MG/100ML; MG/100ML; MG/100ML; MG/100ML
75 INJECTION, SOLUTION INTRAVENOUS CONTINUOUS
Status: DISCONTINUED | OUTPATIENT
Start: 2021-03-11 | End: 2021-03-11 | Stop reason: HOSPADM

## 2021-03-11 RX ORDER — SODIUM CHLORIDE 0.9 % (FLUSH) 0.9 %
5-40 SYRINGE (ML) INJECTION EVERY 8 HOURS
Status: DISCONTINUED | OUTPATIENT
Start: 2021-03-11 | End: 2021-03-11 | Stop reason: HOSPADM

## 2021-03-11 RX ORDER — MIDAZOLAM HYDROCHLORIDE 1 MG/ML
0.5 INJECTION, SOLUTION INTRAMUSCULAR; INTRAVENOUS
Status: DISCONTINUED | OUTPATIENT
Start: 2021-03-11 | End: 2021-03-11 | Stop reason: HOSPADM

## 2021-03-11 RX ORDER — HYDROMORPHONE HYDROCHLORIDE 1 MG/ML
0.2 INJECTION, SOLUTION INTRAMUSCULAR; INTRAVENOUS; SUBCUTANEOUS
Status: DISCONTINUED | OUTPATIENT
Start: 2021-03-11 | End: 2021-03-11 | Stop reason: HOSPADM

## 2021-03-11 RX ORDER — DIPHENHYDRAMINE HYDROCHLORIDE 50 MG/ML
12.5 INJECTION, SOLUTION INTRAMUSCULAR; INTRAVENOUS AS NEEDED
Status: DISCONTINUED | OUTPATIENT
Start: 2021-03-11 | End: 2021-03-11 | Stop reason: HOSPADM

## 2021-03-11 RX ORDER — ONDANSETRON 2 MG/ML
4 INJECTION INTRAMUSCULAR; INTRAVENOUS
Status: DISCONTINUED | OUTPATIENT
Start: 2021-03-11 | End: 2021-03-12 | Stop reason: HOSPADM

## 2021-03-11 RX ORDER — SODIUM CHLORIDE 9 MG/ML
25 INJECTION, SOLUTION INTRAVENOUS CONTINUOUS
Status: DISCONTINUED | OUTPATIENT
Start: 2021-03-11 | End: 2021-03-11 | Stop reason: HOSPADM

## 2021-03-11 RX ORDER — FACIAL-BODY WIPES
10 EACH TOPICAL DAILY PRN
Status: DISCONTINUED | OUTPATIENT
Start: 2021-03-13 | End: 2021-03-12 | Stop reason: HOSPADM

## 2021-03-11 RX ORDER — MIDAZOLAM HYDROCHLORIDE 1 MG/ML
INJECTION, SOLUTION INTRAMUSCULAR; INTRAVENOUS AS NEEDED
Status: DISCONTINUED | OUTPATIENT
Start: 2021-03-11 | End: 2021-03-11 | Stop reason: HOSPADM

## 2021-03-11 RX ORDER — LORATADINE 10 MG/1
10 TABLET ORAL DAILY
Status: DISCONTINUED | OUTPATIENT
Start: 2021-03-12 | End: 2021-03-12 | Stop reason: HOSPADM

## 2021-03-11 RX ORDER — ASPIRIN 81 MG/1
81 TABLET ORAL DAILY
Status: DISCONTINUED | OUTPATIENT
Start: 2021-03-12 | End: 2021-03-12 | Stop reason: HOSPADM

## 2021-03-11 RX ORDER — SODIUM CHLORIDE 0.9 % (FLUSH) 0.9 %
5-40 SYRINGE (ML) INJECTION EVERY 8 HOURS
Status: DISCONTINUED | OUTPATIENT
Start: 2021-03-11 | End: 2021-03-12 | Stop reason: HOSPADM

## 2021-03-11 RX ORDER — FENTANYL CITRATE 50 UG/ML
INJECTION, SOLUTION INTRAMUSCULAR; INTRAVENOUS AS NEEDED
Status: DISCONTINUED | OUTPATIENT
Start: 2021-03-11 | End: 2021-03-11 | Stop reason: HOSPADM

## 2021-03-11 RX ORDER — DEXAMETHASONE SODIUM PHOSPHATE 4 MG/ML
INJECTION, SOLUTION INTRA-ARTICULAR; INTRALESIONAL; INTRAMUSCULAR; INTRAVENOUS; SOFT TISSUE AS NEEDED
Status: DISCONTINUED | OUTPATIENT
Start: 2021-03-11 | End: 2021-03-11 | Stop reason: HOSPADM

## 2021-03-11 RX ORDER — LIDOCAINE HYDROCHLORIDE 10 MG/ML
0.1 INJECTION, SOLUTION EPIDURAL; INFILTRATION; INTRACAUDAL; PERINEURAL AS NEEDED
Status: DISCONTINUED | OUTPATIENT
Start: 2021-03-11 | End: 2021-03-11 | Stop reason: HOSPADM

## 2021-03-11 RX ORDER — NALOXONE HYDROCHLORIDE 0.4 MG/ML
0.4 INJECTION, SOLUTION INTRAMUSCULAR; INTRAVENOUS; SUBCUTANEOUS AS NEEDED
Status: DISCONTINUED | OUTPATIENT
Start: 2021-03-11 | End: 2021-03-12 | Stop reason: HOSPADM

## 2021-03-11 RX ORDER — GENTAMICIN SULFATE 40 MG/ML
INJECTION, SOLUTION INTRAMUSCULAR; INTRAVENOUS AS NEEDED
Status: DISCONTINUED | OUTPATIENT
Start: 2021-03-11 | End: 2021-03-11 | Stop reason: HOSPADM

## 2021-03-11 RX ORDER — GLYCOPYRROLATE 0.2 MG/ML
INJECTION INTRAMUSCULAR; INTRAVENOUS AS NEEDED
Status: DISCONTINUED | OUTPATIENT
Start: 2021-03-11 | End: 2021-03-11 | Stop reason: HOSPADM

## 2021-03-11 RX ORDER — PROPOFOL 10 MG/ML
INJECTION, EMULSION INTRAVENOUS AS NEEDED
Status: DISCONTINUED | OUTPATIENT
Start: 2021-03-11 | End: 2021-03-11 | Stop reason: HOSPADM

## 2021-03-11 RX ORDER — MORPHINE SULFATE 2 MG/ML
2 INJECTION, SOLUTION INTRAMUSCULAR; INTRAVENOUS
Status: DISCONTINUED | OUTPATIENT
Start: 2021-03-11 | End: 2021-03-11 | Stop reason: HOSPADM

## 2021-03-11 RX ORDER — SUCCINYLCHOLINE CHLORIDE 20 MG/ML
INJECTION INTRAMUSCULAR; INTRAVENOUS AS NEEDED
Status: DISCONTINUED | OUTPATIENT
Start: 2021-03-11 | End: 2021-03-11 | Stop reason: HOSPADM

## 2021-03-11 RX ORDER — ONDANSETRON 2 MG/ML
INJECTION INTRAMUSCULAR; INTRAVENOUS AS NEEDED
Status: DISCONTINUED | OUTPATIENT
Start: 2021-03-11 | End: 2021-03-11 | Stop reason: HOSPADM

## 2021-03-11 RX ORDER — EPINEPHRINE 1 MG/ML
INJECTION, SOLUTION, CONCENTRATE INTRAVENOUS AS NEEDED
Status: DISCONTINUED | OUTPATIENT
Start: 2021-03-11 | End: 2021-03-11 | Stop reason: HOSPADM

## 2021-03-11 RX ORDER — ATORVASTATIN CALCIUM 40 MG/1
40 TABLET, FILM COATED ORAL DAILY
Status: DISCONTINUED | OUTPATIENT
Start: 2021-03-12 | End: 2021-03-12 | Stop reason: HOSPADM

## 2021-03-11 RX ORDER — AMLODIPINE BESYLATE 5 MG/1
5 TABLET ORAL 2 TIMES DAILY
Status: DISCONTINUED | OUTPATIENT
Start: 2021-03-11 | End: 2021-03-12 | Stop reason: HOSPADM

## 2021-03-11 RX ORDER — TRAMADOL HYDROCHLORIDE 50 MG/1
50 TABLET ORAL
Status: DISCONTINUED | OUTPATIENT
Start: 2021-03-11 | End: 2021-03-12 | Stop reason: HOSPADM

## 2021-03-11 RX ORDER — SODIUM CHLORIDE, SODIUM LACTATE, POTASSIUM CHLORIDE, CALCIUM CHLORIDE 600; 310; 30; 20 MG/100ML; MG/100ML; MG/100ML; MG/100ML
125 INJECTION, SOLUTION INTRAVENOUS CONTINUOUS
Status: DISCONTINUED | OUTPATIENT
Start: 2021-03-11 | End: 2021-03-11 | Stop reason: HOSPADM

## 2021-03-11 RX ORDER — SODIUM CHLORIDE 9 MG/ML
125 INJECTION, SOLUTION INTRAVENOUS CONTINUOUS
Status: DISCONTINUED | OUTPATIENT
Start: 2021-03-11 | End: 2021-03-12 | Stop reason: HOSPADM

## 2021-03-11 RX ORDER — ACETAMINOPHEN 325 MG/1
650 TABLET ORAL ONCE
Status: COMPLETED | OUTPATIENT
Start: 2021-03-11 | End: 2021-03-11

## 2021-03-11 RX ORDER — AMOXICILLIN 250 MG
1 CAPSULE ORAL 2 TIMES DAILY
Status: DISCONTINUED | OUTPATIENT
Start: 2021-03-11 | End: 2021-03-12 | Stop reason: HOSPADM

## 2021-03-11 RX ORDER — PROCHLORPERAZINE EDISYLATE 5 MG/ML
INJECTION INTRAMUSCULAR; INTRAVENOUS
Status: DISPENSED
Start: 2021-03-11 | End: 2021-03-12

## 2021-03-11 RX ORDER — BACITRACIN 500 [USP'U]/G
OINTMENT TOPICAL AS NEEDED
Status: DISCONTINUED | OUTPATIENT
Start: 2021-03-11 | End: 2021-03-11 | Stop reason: HOSPADM

## 2021-03-11 RX ORDER — HYDRALAZINE HYDROCHLORIDE 25 MG/1
25 TABLET, FILM COATED ORAL 2 TIMES DAILY
Status: DISCONTINUED | OUTPATIENT
Start: 2021-03-11 | End: 2021-03-12 | Stop reason: HOSPADM

## 2021-03-11 RX ORDER — FENTANYL CITRATE 50 UG/ML
50 INJECTION, SOLUTION INTRAMUSCULAR; INTRAVENOUS AS NEEDED
Status: COMPLETED | OUTPATIENT
Start: 2021-03-11 | End: 2021-03-11

## 2021-03-11 RX ORDER — ROPIVACAINE HYDROCHLORIDE 5 MG/ML
30 INJECTION, SOLUTION EPIDURAL; INFILTRATION; PERINEURAL ONCE
Status: DISCONTINUED | OUTPATIENT
Start: 2021-03-11 | End: 2021-03-11 | Stop reason: HOSPADM

## 2021-03-11 RX ORDER — FENTANYL CITRATE 50 UG/ML
25 INJECTION, SOLUTION INTRAMUSCULAR; INTRAVENOUS
Status: DISCONTINUED | OUTPATIENT
Start: 2021-03-11 | End: 2021-03-11 | Stop reason: HOSPADM

## 2021-03-11 RX ORDER — FAMOTIDINE 20 MG/1
20 TABLET, FILM COATED ORAL 2 TIMES DAILY
Status: DISCONTINUED | OUTPATIENT
Start: 2021-03-11 | End: 2021-03-12 | Stop reason: HOSPADM

## 2021-03-11 RX ORDER — SODIUM CHLORIDE 0.9 % (FLUSH) 0.9 %
5-40 SYRINGE (ML) INJECTION AS NEEDED
Status: DISCONTINUED | OUTPATIENT
Start: 2021-03-11 | End: 2021-03-12 | Stop reason: HOSPADM

## 2021-03-11 RX ORDER — NEOSTIGMINE METHYLSULFATE 1 MG/ML
INJECTION INTRAVENOUS AS NEEDED
Status: DISCONTINUED | OUTPATIENT
Start: 2021-03-11 | End: 2021-03-11 | Stop reason: HOSPADM

## 2021-03-11 RX ORDER — ROCURONIUM BROMIDE 10 MG/ML
INJECTION, SOLUTION INTRAVENOUS AS NEEDED
Status: DISCONTINUED | OUTPATIENT
Start: 2021-03-11 | End: 2021-03-11 | Stop reason: HOSPADM

## 2021-03-11 RX ORDER — LIDOCAINE HYDROCHLORIDE 20 MG/ML
INJECTION, SOLUTION EPIDURAL; INFILTRATION; INTRACAUDAL; PERINEURAL AS NEEDED
Status: DISCONTINUED | OUTPATIENT
Start: 2021-03-11 | End: 2021-03-11 | Stop reason: HOSPADM

## 2021-03-11 RX ORDER — POLYETHYLENE GLYCOL 3350 17 G/17G
17 POWDER, FOR SOLUTION ORAL DAILY
Status: DISCONTINUED | OUTPATIENT
Start: 2021-03-12 | End: 2021-03-12 | Stop reason: HOSPADM

## 2021-03-11 RX ORDER — LISINOPRIL 20 MG/1
20 TABLET ORAL 2 TIMES DAILY
Status: DISCONTINUED | OUTPATIENT
Start: 2021-03-11 | End: 2021-03-12 | Stop reason: HOSPADM

## 2021-03-11 RX ORDER — PHENYLEPHRINE HCL IN 0.9% NACL 0.4MG/10ML
SYRINGE (ML) INTRAVENOUS AS NEEDED
Status: DISCONTINUED | OUTPATIENT
Start: 2021-03-11 | End: 2021-03-11 | Stop reason: HOSPADM

## 2021-03-11 RX ORDER — ROPIVACAINE HYDROCHLORIDE 5 MG/ML
INJECTION, SOLUTION EPIDURAL; INFILTRATION; PERINEURAL
Status: COMPLETED | OUTPATIENT
Start: 2021-03-11 | End: 2021-03-11

## 2021-03-11 RX ORDER — HYDROXYZINE HYDROCHLORIDE 10 MG/1
10 TABLET, FILM COATED ORAL
Status: DISCONTINUED | OUTPATIENT
Start: 2021-03-11 | End: 2021-03-12 | Stop reason: HOSPADM

## 2021-03-11 RX ORDER — TRAMADOL HYDROCHLORIDE 50 MG/1
50 TABLET ORAL
Qty: 40 TAB | Refills: 0 | Status: SHIPPED | OUTPATIENT
Start: 2021-03-11 | End: 2021-03-14

## 2021-03-11 RX ORDER — ONDANSETRON 2 MG/ML
4 INJECTION INTRAMUSCULAR; INTRAVENOUS AS NEEDED
Status: DISCONTINUED | OUTPATIENT
Start: 2021-03-11 | End: 2021-03-11 | Stop reason: HOSPADM

## 2021-03-11 RX ADMIN — FENTANYL CITRATE 25 MCG: 50 INJECTION, SOLUTION INTRAMUSCULAR; INTRAVENOUS at 13:43

## 2021-03-11 RX ADMIN — ROPIVACAINE HYDROCHLORIDE 30 ML: 5 INJECTION, SOLUTION EPIDURAL; INFILTRATION; PERINEURAL at 10:37

## 2021-03-11 RX ADMIN — ONDANSETRON 4 MG: 2 INJECTION INTRAMUSCULAR; INTRAVENOUS at 17:35

## 2021-03-11 RX ADMIN — ONDANSETRON 4 MG: 2 INJECTION INTRAMUSCULAR; INTRAVENOUS at 22:10

## 2021-03-11 RX ADMIN — WATER 2 G: 1 INJECTION INTRAMUSCULAR; INTRAVENOUS; SUBCUTANEOUS at 11:06

## 2021-03-11 RX ADMIN — NEOSTIGMINE METHYLSULFATE 3 MG: 1 INJECTION, SOLUTION INTRAVENOUS at 13:58

## 2021-03-11 RX ADMIN — ACETAMINOPHEN 650 MG: 325 TABLET ORAL at 10:18

## 2021-03-11 RX ADMIN — FENTANYL CITRATE 50 MCG: 50 INJECTION, SOLUTION INTRAMUSCULAR; INTRAVENOUS at 10:58

## 2021-03-11 RX ADMIN — MIDAZOLAM 2 MG: 1 INJECTION INTRAMUSCULAR; INTRAVENOUS at 10:26

## 2021-03-11 RX ADMIN — Medication 50 MCG: at 11:51

## 2021-03-11 RX ADMIN — ROCURONIUM BROMIDE 45 MG: 10 SOLUTION INTRAVENOUS at 11:18

## 2021-03-11 RX ADMIN — MIDAZOLAM 2 MG: 1 INJECTION INTRAMUSCULAR; INTRAVENOUS at 10:50

## 2021-03-11 RX ADMIN — LIDOCAINE HYDROCHLORIDE 100 MG: 20 INJECTION, SOLUTION EPIDURAL; INFILTRATION; INTRACAUDAL; PERINEURAL at 10:58

## 2021-03-11 RX ADMIN — LISINOPRIL 20 MG: 20 TABLET ORAL at 17:42

## 2021-03-11 RX ADMIN — SODIUM CHLORIDE, POTASSIUM CHLORIDE, SODIUM LACTATE AND CALCIUM CHLORIDE 125 ML/HR: 600; 310; 30; 20 INJECTION, SOLUTION INTRAVENOUS at 10:16

## 2021-03-11 RX ADMIN — DOCUSATE SODIUM 50 MG AND SENNOSIDES 8.6 MG 1 TABLET: 8.6; 5 TABLET, FILM COATED ORAL at 17:42

## 2021-03-11 RX ADMIN — ONDANSETRON HYDROCHLORIDE 4 MG: 2 INJECTION, SOLUTION INTRAMUSCULAR; INTRAVENOUS at 13:21

## 2021-03-11 RX ADMIN — Medication 75 MCG: at 11:30

## 2021-03-11 RX ADMIN — AMLODIPINE BESYLATE 5 MG: 5 TABLET ORAL at 17:42

## 2021-03-11 RX ADMIN — PROPOFOL 180 MG: 10 INJECTION, EMULSION INTRAVENOUS at 10:58

## 2021-03-11 RX ADMIN — GLYCOPYRROLATE 0.4 MG: 0.2 INJECTION, SOLUTION INTRAMUSCULAR; INTRAVENOUS at 13:56

## 2021-03-11 RX ADMIN — GLYCOPYRROLATE 0.1 MG: 0.2 INJECTION, SOLUTION INTRAMUSCULAR; INTRAVENOUS at 11:54

## 2021-03-11 RX ADMIN — PHENYLEPHRINE HYDROCHLORIDE 50 MCG/MIN: 10 INJECTION INTRAVENOUS at 11:15

## 2021-03-11 RX ADMIN — ONDANSETRON 4 MG: 2 INJECTION INTRAMUSCULAR; INTRAVENOUS at 14:36

## 2021-03-11 RX ADMIN — SODIUM CHLORIDE 125 ML/HR: 9 INJECTION, SOLUTION INTRAVENOUS at 16:19

## 2021-03-11 RX ADMIN — ROCURONIUM BROMIDE 5 MG: 10 SOLUTION INTRAVENOUS at 10:58

## 2021-03-11 RX ADMIN — Medication 40 MCG: at 11:17

## 2021-03-11 RX ADMIN — Medication 50 MCG: at 11:20

## 2021-03-11 RX ADMIN — FENTANYL CITRATE 100 MCG: 50 INJECTION, SOLUTION INTRAMUSCULAR; INTRAVENOUS at 10:26

## 2021-03-11 RX ADMIN — PROCHLORPERAZINE EDISYLATE 5 MG: 5 INJECTION INTRAMUSCULAR; INTRAVENOUS at 14:55

## 2021-03-11 RX ADMIN — DEXAMETHASONE SODIUM PHOSPHATE 4 MG: 4 INJECTION, SOLUTION INTRAMUSCULAR; INTRAVENOUS at 11:29

## 2021-03-11 RX ADMIN — FAMOTIDINE 20 MG: 20 TABLET, FILM COATED ORAL at 17:41

## 2021-03-11 RX ADMIN — TRAMADOL HYDROCHLORIDE 50 MG: 50 TABLET, FILM COATED ORAL at 22:10

## 2021-03-11 RX ADMIN — ROCURONIUM BROMIDE 10 MG: 10 SOLUTION INTRAVENOUS at 12:09

## 2021-03-11 RX ADMIN — HYDRALAZINE HYDROCHLORIDE 25 MG: 25 TABLET, FILM COATED ORAL at 17:42

## 2021-03-11 RX ADMIN — Medication 60 MCG: at 11:40

## 2021-03-11 RX ADMIN — SUCCINYLCHOLINE CHLORIDE 120 MG: 20 INJECTION, SOLUTION INTRAMUSCULAR; INTRAVENOUS at 10:59

## 2021-03-11 RX ADMIN — CEFAZOLIN SODIUM 2 G: 1 INJECTION, POWDER, FOR SOLUTION INTRAMUSCULAR; INTRAVENOUS at 19:08

## 2021-03-11 NOTE — ANESTHESIA PROCEDURE NOTES
Peripheral Block    Start time: 3/11/2021 10:26 AM  End time: 3/11/2021 10:31 AM  Performed by: Skip Santiago MD  Authorized by: Skip Santiago MD       Pre-procedure: Indications: at surgeon's request and post-op pain management    Preanesthetic Checklist: patient identified, risks and benefits discussed, site marked, timeout performed and patient being monitored    Timeout Time: 10:26          Block Type:   Block Type:   Interscalene  Laterality:  Left  Monitoring:  Standard ASA monitoring, continuous pulse ox, frequent vital sign checks, heart rate, responsive to questions and oxygen  Injection Technique:  Single shot  Procedures: ultrasound guided    Patient Position: supine  Prep: betadine and povidone-iodine 7.5% surgical scrub    Location:  Interscalene  Needle Type:  Stimuplex  Needle Gauge:  22 G  Needle Localization:  Ultrasound guidance  Medication Injected:  Ropivacaine (PF) (NAROPIN)(0.5%) 5 mg/mL injection, 30 mL    Assessment:  Number of attempts:  1  Injection Assessment:  Incremental injection every 5 mL, local visualized surrounding nerve on ultrasound, negative aspiration for blood, no paresthesia, negative aspiration for CSF and ultrasound image on chart  Patient tolerance:  Patient tolerated the procedure well with no immediate complications

## 2021-03-11 NOTE — PERIOP NOTES
TRANSFER - OUT REPORT:    Verbal report given to Cristina Humphrey RN on Josephine Skelton  being transferred to 2 for routine post - op       Report consisted of patients Situation, Background, Assessment and   Recommendations(SBAR). Time Pre op antibiotic given:1106  Anesthesia Stop time: 8083  Peñaloza Present on Transfer to floor:no  Order for Peñaloza on Chart:no  Discharge Prescriptions with Chart:no    Information from the following report(s) SBAR, OR Summary, Intake/Output, MAR and Recent Results was reviewed with the receiving nurse. Opportunity for questions and clarification was provided. Is the patient on 02? YES       L/Min 1       Other -    Is the patient on a monitor? NO    Is the nurse transporting with the patient? NO    Surgical Waiting Area notified of patient's transfer from PACU? YES      The following personal items collected during your admission accompanied patient upon transfer:   Dental Appliance: Dental Appliances: (clothes, suitcase, glasses returned in pacu)  Vision:    Hearing Aid:    Jewelry:    Clothing: Clothing: (clothes bag to pacu)  Other Valuables:  Other Valuables: Eyeglasses, Suitcase  Valuables sent to safe:

## 2021-03-11 NOTE — BRIEF OP NOTE
Brief Postoperative Note    Patient: Tamir Jain  YOB: 1950  MRN: [id number]    Date of Procedure: 3/11/2021     Pre-Op Diagnosis: OA LEFT SHOULDER    Post-Op Diagnosis: Same as preoperative diagnosis. Procedure(s):  LEFT TOTAL SHOULDER ARTHROPLASTY    Surgeon(s): MD Robe Romero MD    Surgical Assistant: Surg Asst-1: Vernon Garcia    Anesthesia: General with interscalene block    Estimated Blood Loss (mL): 451     Complications: None    Specimens: BONE DISCARDED    Implants:   Implant Name Type Inv.  Item Serial No.  Lot No. LRB No. Used Action   STEM HUM [id number] SHLDR ROCK PRESSFIT EQUINOXE - [id number]  STEM HUM [id number] SHLDR ROCK PRESSFIT EQUINOXE [id number] [employer] N/A Left 1 Implanted   CEMENT BONE 70GM FULL DOSE CA PHOS W/ GENTMYCN HI VISC N - SN/A  CEMENT BONE 70GM FULL DOSE CA PHOS W/ GENTMYCN HI VISC N N/A [employer]WD [id number] Left 1 Implanted   EQUINOXE CAGE GLENOID POSTERIOR AUGMENT, CEMENTED LEFT, EXTRA LARGE   [id number] [employer] NA Left 1 Implanted   PLATE BNE 0.4JP KIMBERLY REPLICATOR O/S EQUINOXE - [id number]  PLATE BNE 6.9LK KIMBERLY REPLICATOR O/S EQUINOXE [id number] [employer] NA Left 1 Implanted   SCREW BNE AD PED L47MM [id number] CANC SHLDR NONLOCKING - [id number]  SCREW BNE AD PED L47MM [id number] CANC SHLDR NONLOCKING [id number] [employer] NA Left 1 Implanted   HEAD HUM [id number] TALL SHLDR FOR HEMIARTHROPLASTY EQUINOXE - [id number]  HEAD HUM [id number] TALL SHLDR FOR HEMIARTHROPLASTY EQUINOXE [id number] [employer] NA Left 1 Implanted       Drains: * No LDAs found *    Findings OA    Electronically Signed by Francisca Cuba MD on 3/11/2021 at 1:48 PM

## 2021-03-11 NOTE — PERIOP NOTES
1025 am timeout done with Dr. Shama Velasquez for a left shoulder nerve block. Patient consented marked and placed on monitor prior to start of block. Vss. Will continue to monitor. 30 ml of0.5%  Ropivacaine used for the block. 2 mg of versed and 100 mcg of fentanyl given for sedation. Will continue to monitor.

## 2021-03-11 NOTE — ANESTHESIA PREPROCEDURE EVALUATION
Relevant Problems   No relevant active problems       Anesthetic History   No history of anesthetic complications            Review of Systems / Medical History  Patient summary reviewed, nursing notes reviewed and pertinent labs reviewed    Pulmonary  Within defined limits                 Neuro/Psych   Within defined limits           Cardiovascular    Hypertension: well controlled          CAD         GI/Hepatic/Renal  Within defined limits              Endo/Other    Diabetes: well controlled, type 2    Arthritis     Other Findings              Physical Exam    Airway  Mallampati: I  TM Distance: > 6 cm  Neck ROM: normal range of motion   Mouth opening: Normal     Cardiovascular  Regular rate and rhythm,  S1 and S2 normal,  no murmur, click, rub, or gallop             Dental  No notable dental hx       Pulmonary  Breath sounds clear to auscultation               Abdominal  GI exam deferred       Other Findings            Anesthetic Plan    ASA: 2  Anesthesia type: general      Post-op pain plan if not by surgeon: peripheral nerve block single    Induction: Intravenous  Anesthetic plan and risks discussed with: Patient

## 2021-03-11 NOTE — PROGRESS NOTES
TRANSFER - IN REPORT:    Verbal report received from [facility]) on Rhae Mince  being received from [facility]) for routine post - op      Report consisted of patients Situation, Background, Assessment and   Recommendations(SBAR). Information from the following report(s) SBAR, Kardex and OR Summary was reviewed with the receiving nurse. Opportunity for questions and clarification was provided. Assessment completed upon patients arrival to unit and care assumed.

## 2021-03-11 NOTE — PERIOP NOTES
Spoke to Dr. Nestor Lama about pt. Nausea and need to straight cath. Ok to send to floor for a while and possibly discharge later if he feels like it. Patient ok with this plan.

## 2021-03-11 NOTE — H&P
[facility]AG[facility] PRE-OP HISTORY AND PHYSICAL    Subjective:     Patient is a 73 y.o. male presented with a history of left shoulder pain. Onset of symptoms was gradual with gradually worsening course since that time. He is being admitted for surgical management of this condition. Patient Active Problem List    Diagnosis Date Noted    CAD (coronary artery disease)     Other chest pain 01/14/2021    Prostate CA ([facility]     DM (diabetes mellitus) ([facility]     Hypertension complicating diabetes ([facility] 05/07/2020    Primary localized osteoarthritis of right hip 03/17/2020    Type II diabetes mellitus ([facility] 03/17/2020     Past Medical History:   Diagnosis Date    Arthritis     Basal cell carcinoma 01/2021    FACE     Basal cell carcinoma (BCC) of chest 2019    LEFT SIDE     CAD (coronary artery disease)     Chronic pain     DM (diabetes mellitus) ([facility]     NOT ON MEDICATION    Hypertension     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    Organ donor     ON BRAIN DONOR LIST    Prostate CA ([facility]     active surveillance      Past Surgical History:   Procedure Laterality Date    HX GI      COLONOSCOPY    HX HEENT      WISDOM TEETH     HX HEENT      BCCA REMOVED FROM FACE     HX HIP REPLACEMENT Right     HX OTHER SURGICAL  2020    BCCA REMOVED FROM LEFT CHEST     HX POLYPECTOMY      MO CARDIAC SURG PROCEDURE UNLIST  02/2021    CARDIAC CATHETERIZATION     MO PROSTATE BIOPSY, NEEDLE, SATURATION SAMPLING  2020    MO TOTAL HIP ARTHROPLASTY Right 03/17/2020      Prior to Admission medications    Medication Sig Start Date End Date Taking? Authorizing Provider   hydrALAZINE (APRESOLINE) 25 mg tablet Take 25 mg by mouth two (2) times a day. Yes Provider, Historical   atorvastatin (LIPITOR) 40 mg tablet Take 40 mg by mouth daily. Yes Provider, Historical   loratadine (CLARITIN) 10 mg tablet Take 10 mg by mouth daily.    Yes Provider, Historical   cholecalciferol, vitamin D3, (Vitamin D3) 50 mcg (2,000 unit) tab Take  by mouth daily. Yes Provider, Historical   amoxicillin (AMOXIL) 500 mg capsule TAKE 4 CAPSULES BY MOUTH ONCE FOR ONE DOSE 1 HOUR BEFORE DENTAL WORK 1/14/21  Yes Joel Matthews MD   tadalafiL (Cialis) 5 mg tablet Take 1 Tab by mouth daily. 6/2/20  Yes Joel Matthews MD   lisinopriL (PRINIVIL, ZESTRIL) 20 mg tablet Take 20 mg by mouth two (2) times a day. Yes Provider, Historical   amLODIPine (NORVASC) 5 mg tablet Take 5 mg by mouth two (2) times a day. Yes Provider, Historical   aspirin delayed-release 81 mg tablet Take 1 Tab by mouth two (2) times a day. Indications: blood clot prevention  Patient taking differently: Take 81 mg by mouth daily. Indications: blood clot prevention 3/17/20  Yes Fatoumata Caldwell PA-C     Allergies   Allergen Reactions    Codeine Nausea and Vomiting      Social History     Tobacco Use    Smoking status: Never Smoker    Smokeless tobacco: Never Used   Substance Use Topics    Alcohol use: Yes     Alcohol/week: 7.0 standard drinks     Types: 7 Shots of liquor per week      Family History   Problem Relation Age of Onset    Colon Cancer Mother 77    Lung Cancer Father 77        smoker    Coronary Artery Disease Father     Cancer Sister         brain/bone cancer -- ?primary    Anesth Problems Neg Hx       Review of Systems  A comprehensive review of systems was negative except for that written in the HPI. Objective:     Patient Vitals for the past 8 hrs:   BP Temp Pulse Resp SpO2 Weight   03/11/21 1002 (!) 155/86 97.7 °F (36.5 °C) 80 18 100 % 89.7 kg (197 lb 12 oz)     Visit Vitals  BP (!) 155/86 (BP 1 Location: Right upper arm, BP Patient Position: At rest)   Pulse 80   Temp 97.7 °F (36.5 °C)   Resp 18   Wt 89.7 kg (197 lb 12 oz)   SpO2 100%   BMI 26.82 kg/m²     General:  Alert, cooperative, no distress, appears stated age. Head:  Normocephalic, without obvious abnormality, atraumatic. Eyes:  Conjunctivae/corneas clear. PERRL, EOMs intact. Fundi benign   Ears:  Normal TMs and external ear canals both ears. Nose: Nares normal. Septum midline. Mucosa normal. No drainage or sinus tenderness. Throat: Lips, mucosa, and tongue normal. Teeth and gums normal.   Neck: Supple, symmetrical, trachea midline, no adenopathy, thyroid: no enlargement/tenderness/nodules, no carotid bruit and no JVD. Back:   Symmetric, no curvature. ROM normal. No CVA tenderness. Lungs:   Clear to auscultation bilaterally. Chest wall:  No tenderness or deformity. Heart:  Regular rate and rhythm, S1, S2 normal, no murmur, click, rub or gallop. Abdomen:   Soft, non-tender. Bowel sounds normal. No masses,  No organomegaly. Extremities: Left shoulder: pain with limited ROM. NVI   Pulses: 2+ and symmetric all extremities. Skin: Skin color, texture, turgor normal. No rashes or lesions   Lymph nodes: Cervical, supraclavicular, and axillary nodes normal.   Neurologic: CNII-XII intact. Normal strength, sensation and reflexes throughout. Assessment:     Active Problems:    * No active hospital problems. *      Plan:     The various methods of treatment have been discussed with the patient and family. After consideration of risks, benefits and other options for treatment, the patient has consented to surgical intervention. Risks of infection, DVT, PE, MI, CVA and unforeseen events described to the patient. Additionally discussed the possibility of not being able to resolve all preop pain. Patient understands metal and plastic will be implanted in the body and understands the potential for revision surgery. Patient wishes to proceed with elective surgery.

## 2021-03-11 NOTE — ROUTINE PROCESS
Patient: Julia Davis MRN: [id number]  SSN: [id number] YOB: 1950  Age: 73 y.o. Sex: male Patient is status post Procedure(s): LEFT TOTAL SHOULDER ARTHROPLASTY. Surgeon(s) and Role: 
   Kalpana Sanchez MD - Primary Chhaya Luna MD - Fellow Local/Dose/Irrigation: block in pre op holding, no additional local. 1 mg epi applied topically, 80 mg gentamycin IM injection Peripheral IV 03/11/21 Posterior;Right Wrist (Active) Site Assessment Clean, dry, & intact 03/11/21 1016 Phlebitis Assessment 0 03/11/21 1016 Infiltration Assessment 0 03/11/21 1016 Dressing Status Clean, dry, & intact; New 03/11/21 1016 Dressing Type Tape;Transparent 03/11/21 1016 Hub Color/Line Status Infusing;Green 03/11/21 1016 Dressing/Packing:  Incision 03/11/21 Shoulder Left-Dressing/Treatment: Steri-strips;Gauze dressing/dressing sponge;Tape/Soft cloth adhesive tape;Petroleum gauze (03/11/21 1343) Splint/Cast:  ] Other: SCD sleeves

## 2021-03-11 NOTE — ANESTHESIA POSTPROCEDURE EVALUATION
Post-Anesthesia Evaluation and Assessment    Patient: Yanet Chambers MRN: [id number]  SSN: [id number]    YOB: 1950  Age: 73 y.o. Sex: male      I have evaluated the patient and they are stable and ready for discharge from the PACU. Cardiovascular Function/Vital Signs  Visit Vitals  /73   Pulse 77   Temp 36.3 °C (97.4 °F)   Resp 16   Wt 89.7 kg (197 lb 12 oz)   SpO2 92%   BMI 26.82 kg/m²       Patient is status post General anesthesia for Procedure(s):  LEFT TOTAL SHOULDER ARTHROPLASTY. Nausea/Vomiting: None    Postoperative hydration reviewed and adequate. Pain:  Pain Scale 1: Numeric (0 - 10) (03/11/21 1418)  Pain Intensity 1: 0 (03/11/21 1418)   Managed    Neurological Status:   Neuro (WDL): Exceptions to WDL (03/11/21 1418)  Neuro  Neurologic State: Drowsy (03/11/21 1418)  LUE Motor Response: Numbness (03/11/21 1418)  LLE Motor Response: Purposeful (03/11/21 1418)  RUE Motor Response: Purposeful (03/11/21 1418)  RLE Motor Response: Purposeful (03/11/21 1418)   At baseline    Mental Status, Level of Consciousness: Alert and  oriented to person, place, and time    Pulmonary Status:   O2 Device: Nasal cannula (03/11/21 1419)   Adequate oxygenation and airway patent    Complications related to anesthesia: None    Post-anesthesia assessment completed. No concerns    Signed By: Leanor Bence, MD     March 11, 2021              Procedure(s):  LEFT TOTAL SHOULDER ARTHROPLASTY. general    <BSHSIANPOST>    INITIAL Post-op Vital signs:   Vitals Value Taken Time   /73 03/11/21 1435   Temp 36.3 °C (97.4 °F) 03/11/21 1419   Pulse 76 03/11/21 1439   Resp 12 03/11/21 1439   SpO2 92 % 03/11/21 1439   Vitals shown include unvalidated device data.

## 2021-03-12 VITALS
DIASTOLIC BLOOD PRESSURE: 76 MMHG | OXYGEN SATURATION: 97 % | BODY MASS INDEX: 26.78 KG/M2 | HEIGHT: 72 IN | WEIGHT: 197.75 LBS | TEMPERATURE: 98 F | RESPIRATION RATE: 16 BRPM | HEART RATE: 81 BPM | SYSTOLIC BLOOD PRESSURE: 150 MMHG

## 2021-03-12 PROCEDURE — 74011250637 HC RX REV CODE- 250/637: Performed by: ORTHOPAEDIC SURGERY

## 2021-03-12 PROCEDURE — 74011000250 HC RX REV CODE- 250: Performed by: ORTHOPAEDIC SURGERY

## 2021-03-12 PROCEDURE — 97110 THERAPEUTIC EXERCISES: CPT

## 2021-03-12 PROCEDURE — 74011250636 HC RX REV CODE- 250/636: Performed by: ORTHOPAEDIC SURGERY

## 2021-03-12 PROCEDURE — 51798 US URINE CAPACITY MEASURE: CPT

## 2021-03-12 PROCEDURE — 97165 OT EVAL LOW COMPLEX 30 MIN: CPT

## 2021-03-12 PROCEDURE — 97535 SELF CARE MNGMENT TRAINING: CPT

## 2021-03-12 RX ORDER — ASPIRIN 325 MG
325 TABLET ORAL 2 TIMES DAILY
Qty: 60 TAB | Refills: 1 | Status: SHIPPED
Start: 2021-03-12 | End: 2021-05-18

## 2021-03-12 RX ORDER — ONDANSETRON 4 MG/1
4 TABLET, ORALLY DISINTEGRATING ORAL
Qty: 10 TAB | Refills: 0 | Status: SHIPPED
Start: 2021-03-12 | End: 2021-05-18

## 2021-03-12 RX ADMIN — ASPIRIN 81 MG: 81 TABLET, COATED ORAL at 08:46

## 2021-03-12 RX ADMIN — ATORVASTATIN CALCIUM 40 MG: 40 TABLET, FILM COATED ORAL at 08:45

## 2021-03-12 RX ADMIN — FAMOTIDINE 20 MG: 20 TABLET, FILM COATED ORAL at 08:46

## 2021-03-12 RX ADMIN — TRAMADOL HYDROCHLORIDE 50 MG: 50 TABLET, FILM COATED ORAL at 03:57

## 2021-03-12 RX ADMIN — DOCUSATE SODIUM 50 MG AND SENNOSIDES 8.6 MG 1 TABLET: 8.6; 5 TABLET, FILM COATED ORAL at 08:46

## 2021-03-12 RX ADMIN — AMLODIPINE BESYLATE 5 MG: 5 TABLET ORAL at 08:46

## 2021-03-12 RX ADMIN — LORATADINE 10 MG: 10 TABLET ORAL at 08:46

## 2021-03-12 RX ADMIN — LISINOPRIL 20 MG: 20 TABLET ORAL at 08:46

## 2021-03-12 RX ADMIN — TRAMADOL HYDROCHLORIDE 50 MG: 50 TABLET, FILM COATED ORAL at 09:47

## 2021-03-12 RX ADMIN — HYDRALAZINE HYDROCHLORIDE 25 MG: 25 TABLET, FILM COATED ORAL at 08:45

## 2021-03-12 RX ADMIN — CEFAZOLIN SODIUM 2 G: 1 INJECTION, POWDER, FOR SOLUTION INTRAMUSCULAR; INTRAVENOUS at 03:04

## 2021-03-12 NOTE — PROGRESS NOTES
I have reviewed discharge instructions with the patient. The patient verbalized understanding. All questions answered, cleared PT, sister to transport home.

## 2021-03-12 NOTE — DISCHARGE INSTRUCTIONS
See chart for discharge instructions    ______________________________________________________________________    Anesthesia Discharge Instructions    After general anesthesia or intervenous sedation, for 24 hours or while taking prescription Narcotics:  · Limit your activities  · Do not drive or operate hazardous machinery  · If you have not urinated within 8 hours after discharge, please contact your surgeon on call. · Do not make important personal or business decisions  · Do not drink alcoholic beverages    Report the following to your surgeon:  · Excessive pain, swelling, redness or odor of or around the surgical area  · Temperature over 100.5 degrees  · Nausea and vomiting lasting longer than 4 hours or if unable to take medication  · Any signs of decreased circulation or nerve impairment to extremity:  Change in color, persistent numbness, tingling, coldness or increased pain. · Any questions      Shoulder Surgery:  Postoperative Instructions    Diet   *You may resume your regular diet as soon as possible    Medication     *Take the pain medication as prescribed   *Take pain medication with food   *While taking pain medications, you may NOT operate a vehicle, heavy  machinery, or appliances   *While taking pain medication, you may NOT drink alcoholic beverages   *If you have any reactions to your medications, stop taking them and call my office   *Please keep in mind that constipation is a very common side effect of taking narcotic pain medication. Take precautions to prevent constipation.     *Drink plenty of water (6-8 glasses of 8 oz a day)    *Avoid alcohol, caffeine, and dairy products    *Eat plenty of fiber(friuts, vegetables, and whole grains)    *Take an over the counter stool softener (Colace or Dulcolax)    * Patients that have had upper extremity surgery should take ferquent  walks    Activity     *Minimize activity the day of surgery   *DO NOT USE HEAT    *Please keep ice applied to the shoulder for the first 72 hours or as long as pain or swelling persists. Do no apply ice directly to skin, or allow water to leak on your dressing. *Place a pillow behind your elbow while lying down or sleeping. Sleeping in a more upright position (recliner) may be more comfortable initially. You should NOT toll onto the operative shoulder. *You are in an immobilizer or sling and it should remain on at all times except when showering until your first postoperative visit. * Open and close your hand 10 times every day for 1 minute. You may also flex and extend your elbow each day when your sling is removed for showering. Be sure to keep your elbow at your side. *DO NOT actively (on your own) lift your operative arm away from the side of your body or rotate it our away from your body. *DO NOT use exercise equipment unless otherwise instructed. Sling/Immoblizer     *Use the sling/immobilizer at all times and while sleeping until your next office appointment. *If you have difficulty with your shoulder immobilizer sling you may find this web video helpful:  [email or web address]     Showering   *You may shower 3 days after surgery unless told otherwise. DO NOT immerse the shoulder under water and DO NOT rub the incision. Place new Band-Aids over the sutures after showering. *You may sponge bathe for the first 72 hours, taking care to keep the dressing clean and dry. Dressing Care     *It is normal to get some bloody wound seepage through the bandage. DO NOT BE ALARMED. *If the dressing gets soaked with wound seepage, place more gauze over the dressing and secure with tape. If the soaks through remove the entire dressing and replace with STERILE gauze and tape. *Remove all dressings at 72 hours after surgery. If here is still some wound seepage, apply a fresh sterile gauze over the incisions and secure with tape.    *DO NOT TOUCH OR REMOVE THE SUTURES!! Emergency/Follow-up   *Please notify my office at [phone] if you develop any fever (101 deg or above), unexpected warmth, redness or swelling. Please call if your fingers become cold, purple, numb, or there is excessive bleeding. *Please call the office within 24 hours at [phone] to schedule a follow up appt within 7-10 days from surgery. *Narcotic pain medication refills cannot be called into your pharmacy and the Rx must be picked up at our office. No pain medication refills can be provided after 3pm on Fridays or over the weekend.

## 2021-03-12 NOTE — PROGRESS NOTES
Bedside and Verbal shift change report given to [id number] [place] [address] (oncoming nurse) by William Reyes RN (offgoing nurse). Report included the following information SBAR, Kardex and MAR.

## 2021-03-12 NOTE — PROGRESS NOTES
[facility]: Patient plans to discharge home today. Family to transport. Patient is self care and no CM needs at this time. CM following. Observation notice provided in writing to patient and/or caregiver as well as verbal explanation of the policy. Patients who are in outpatient status also receive the Observation notice.       RUR: 8%    Beni Bauhg RN/CRM

## 2021-03-12 NOTE — PROGRESS NOTES
POD 1 Day Post-Op    Procedure:  Procedure(s):  LEFT TOTAL SHOULDER ARTHROPLASTY    Subjective:     Patient has no complaints today. Patient has complaints of pain. Objective:     Blood pressure 139/62, pulse 98, temperature 98.2 °F (36.8 °C), resp. rate 18, height 6' 0.01\" (1.829 m), weight 89.7 kg (197 lb 12 oz), SpO2 98 %. Temp (24hrs), Av.6 °F (36.4 °C), Min:97.3 °F (36.3 °C), Max:98.2 °F (36.8 °C)      Physical Exam:  Examination of the left shoulder reveals that the dressing is clean and intact. Sensation is intact to light touch. Brisk capillary refill.      Labs:   Lab Results   Component Value Date/Time    HGB 13.8 2021 03:06 PM         Assessment:     Active Problems:    OA (osteoarthritis) of shoulder (3/11/2021)        Plan/Recommendations/Medical Decision Making:     Continue physical therapy  Ice and elevate  Begin discharge planning   Dispo: D/c today

## 2021-03-12 NOTE — OP NOTES
[facility]  OPERATIVE REPORT    Name:  Elkin Harrell  MR#:  [id number]  :  1950  ACCOUNT #:  [de-identified]  DATE OF SERVICE:  2021      PREOPERATIVE DIAGNOSIS:  End-stage osteoarthritis of his left shoulder. POSTOPERATIVE DIAGNOSIS:  End-stage osteoarthritis of his left shoulder. PROCEDURE PERFORMED:  Left shoulder arthroplasty. SURGEON:  Parmjit Harris MD    ASSISTANT:  Dana Oppenheim, MD    ANESTHESIA:  General with interscalene block. COMPLICATIONS:  None. SPECIMENS REMOVED:  Bone discarded. IMPLANTS:  Exactech Equinoxe size 17 stem with a 4.5 replicator plate and a 53 tall humeral head. The glenoid was a caged peg glenoid; 8-degree augment, size extra large. ESTIMATED BLOOD LOSS:  Approximately 200 mL. INTRAVENOUS FLUIDS:  Crystalloids were given. PERIOPERATIVE MEDICINE:  2 g of Ancef. HISTORY:  The patient is a 53-year-old gentleman who presented to my office a while ago with bilateral shoulder pain. His x-rays and physical exam were consistent with end-stage osteoarthritis of both the shoulders. We initiated conservative treatment. While this was helpful, his pain returned. He returned back to my office several weeks ago with increasing discomfort of his left shoulder. At that time, we engaged in a conversation of other options including total shoulder replacement. I went over the surgical technique with the patient. We did offer continuing the nonoperative treatment. Unfortunately, his pain was getting out of control. He was noting a significant decline in his daily activities. He wished to proceed with total shoulder replacement. Again, I went through the technique with the patient. We discussed the risks such as but not limited to postoperative pain, numbness and tingling, swelling, black and blue, continued pain even after the surgery, DVT, PE, MI, CVA and other unforeseen events that could occur in a perioperative fashion.   The patient understood metal and plastic would be implanted to the body. Lastly, he understood there is a significant amount of rehabilitation would follow. Understanding all the risks and benefits as mentioned above, he wished to proceed, signed the consent, was taken to surgery. OPERATIVE PLAN:  The patient was taken back to surgery, placed supine on the operating table, administered general anesthetic through endotracheal intubation. He was placed in the beach chair position with all bony prominences protected. The left upper extremity was sterilely prepped and draped x2. The patient received 2 g of Ancef. A time-out was performed and all parties agreed that the left shoulder was the correct shoulder. Following the time-out, an anterior incision was made with a 10-blade. The knife and handle were passed off the table. Blunt dissection was taken down to the cephalic vein. The cephalic vein was identified and dissected laterally with the deltoid. The deltopectoral interval was then exploited. A small portion of the pectoralis was taken down for exposure purposes. Fluid was identified in the sheath of the long head of the biceps. This was opened and a synovectomy was performed. The long head of the biceps was perfectly intact. The rotator cuff system was intact. The short head of the biceps was dissected from the subscapularis. The circumflex vasculature was tied off with #2 Vicryl. The rotator cuff interval was opened with a pair of Metzenbaum scissors. Stay stitches of Ethibond were placed at the musculotendinous junction of the subscapularis. At that time, an arthrotomy was made in standard fashion. Inferior dissection was performed along the neck of the humerus. Significant osteophytes were removed as the shoulder was externally rotated and the capsule was released. Once all the osteophytes and the capsule were released, the shoulder was dislocated up into the wound.   The neck angle was identified and an osteectomy was performed using a bone saw. As predicted, there were several areas of cyst formation that had propagated even into the metadiaphyseal junction of the proximal humerus. However, with stem fixation, this should not, and nor did it, interfere with stem fixation. The IM canal was entered with a 7-mm reamer and dilated all the way up to 17. We then broached up to a size 17. A Press-Fit size 17 stem was inserted. We did use some bone graft from the proximal humerus for fixation as well as grafting of the cyst at the metadiaphyseal area of the shoulder. At that time, a large manhole cover was applied over the trunnion of the stem. The shoulder was then posteriorly dislocated. The outrigger for the navigation system was deployed and secured onto the coracoid with bone screws. We then dissected the anterior capsule away from the subscapularis tendon. An anterior glenoid retractor was then deployed. The anterior-inferior labrum and capsule were taken down through the inferior capsule. With this released, a full view of the glenoid was identified. We then mapped our salient bony landmarks for navigation purposes. We chose an 8-degree extra large based on the preoperative CT scan. The center of the glenoid was identified using navigation and our opening drill was prepared. The vault was not violated. The starter reamer was then deployed and we reamed all the way up to an extra large. The peripheral drill sleeve for the glenoid was then deployed and the peripheral pegs were then drilled. We trialed with an extra large 8-degree posterior augment and this was the correct size. There was bony overgrowth that was noted and a 0.25-inch curved and straight osteotome was used to perform an osteectomy around the inferior and posterior-inferior perimeter of bone. This bone was all removed. The trial component was removed. The bone stock was pulsatilely irrigated.   80 mg of gentamicin was injected into the inferior capsule. Antibiotic-impregnated cement was mixed on the back table. We dried the glenoid bone stock and then cemented the peripheral pegs and impacted the glenoid. All excess cement was removed. After the curing of the cement, the glenoid was manipulated, there was no evidence of instability. The wound was then copiously irrigated. The  proximal humerus was dislocated back up into the wound. We chose a 4.5 replicator plate and dialed in the correct eccentricity. We then trialed with a size 53 short and then a 53 tall. The 53 tall gave better tension onto the rotator cuff system and did not interfere with the range of motion. At that time, the shoulder was dislocated one last time. The trial head was removed. The final component was then placed in the proximal humerus. The shoulder was relocated. The wound was copiously irrigated. Betadine diluted in 500 mL of normal saline was also rinsed to the joint. This was then pulsatilely irrigated through. All parties changed out their outer gloves. #2 FiberWire was used to close the subscapularis in a figure-of-eight interrupted fashion. The pectoralis was also repaired using #2 FiberWire. #1 Vicryl was then used to close the deltopectoral interval.  The wound was irrigated one last time. 2-0 Vicryl was placed in the subcutaneous tissue and the dermis and a running 4-0 Monocryl was placed in the epidermis. Steri-Strips were applied over the incision followed by Adaptic and bacitracin ointment. Sterile dry dressing was placed over the shoulder. The patient was placed in an immobilizer, awakened from general anesthetic, and transferred to the recovery room in stable condition.         MD JAME Carver/[email or web address]  D:  03/11/2021 18:35  T:  03/12/2021 3:02  JOB #:  [id number]

## 2021-03-12 NOTE — PROGRESS NOTES
OCCUPATIONAL THERAPY EVALUATION/DISCHARGE  Patient: Yanet Chambers (72 y.o. male)  Date: 3/12/2021  Primary Diagnosis: OA (osteoarthritis) of shoulder [M19.019]  Procedure(s) (LRB):  LEFT TOTAL SHOULDER ARTHROPLASTY (Left) 1 Day Post-Op   Precautions:   Fall, NWB(L TSR no active Abd, ER, pendulums)    ASSESSMENT  Based on the objective data described below, the patient presents with decreased independence with self care following admission for L TSR. He progressed well with dressing and bathing activities following instructions. He completed exercises while standing at counter with independence. Pt provided written education education for exercises and sling management for home as well. He has no further need for for continued intervention. recommend up ad renata and support for ADLs at home as needed. Pt is cleared for discharge home today. Current Level of Function (ADLs/self-care): min A for basic ADl activities    Functional Outcome Measure: The patient scored 90 on the Barthel Index outcome measure which is indicative of 10% impairment with ADl activities. Other factors to consider for discharge: limited ROM in LUE     PLAN :  Recommend with staff: up ad renata    Recommendation for discharge: (in order for the patient to meet his/her long term goals)  No skilled occupational therapy/ follow up rehabilitation needs identified at this time. This discharge recommendation:  Has been made in collaboration with the attending provider and/or case management    IF patient discharges home will need the following DME: none       SUBJECTIVE:   Patient stated I am felling pretty good. 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    Organ donor     ON BRAIN DONOR LIST    Prostate CA [facility])     active surveillance     Past Surgical History:   Procedure Laterality Date    HX GI      COLONOSCOPY    HX HEENT      WISDOM TEETH     HX HEENT      BCCA REMOVED FROM FACE     HX HIP REPLACEMENT Right     HX OTHER SURGICAL  2020    BCCA REMOVED FROM LEFT CHEST     HX POLYPECTOMY      [place] CARDIAC SURG PROCEDURE UNLIST  02/2021    CARDIAC CATHETERIZATION     [place] PROSTATE BIOPSY, NEEDLE, SATURATION SAMPLING  2020    [place] TOTAL HIP ARTHROPLASTY Right 03/17/2020       Prior Level of Function/Environment/Context: pt is independent at home. Expanded or extensive additional review of patient history:   Home Situation  Home Environment: Private residence  # Steps to Enter: 5  Rails to Enter: Yes  One/Two Story Residence: One story  Living Alone: No  Support Systems: Family member(s)  Patient Expects to be Discharged to[de-identified] Private residence  Current DME Used/Available at Home: None  Tub or Shower Type: Shower    Hand dominance: Right    EXAMINATION OF PERFORMANCE DEFICITS:  Cognitive/Behavioral Status:  Neurologic State: Alert  Orientation Level: Oriented X4  Cognition: Appropriate for age attention/concentration  Perception: Appears intact  Perseveration: No perseveration noted  Safety/Judgement: Good awareness of safety precautions    Skin: see nursing notes    Edema: none noted    Hearing: Auditory  Auditory Impairment: None    Vision/Perceptual:                           Acuity: Within Defined Limits         Range of Motion:    AROM: Within functional limits(LUE impaired due to TSR)  PROM: Within functional limits(LUE impaired due to TSR)                      Strength:    Strength: Within functional limits(LUE impaired due to TSR)                Coordination:  Coordination: Within functional limits(LUE impaired due to TSR)  Fine Motor Skills-Upper: Right Intact; Left Intact    Gross Motor Skills-Upper: Right Intact; Left Impaired    Tone & Sensation:    Tone: Normal  Sensation: Intact                      Balance:  Sitting: Intact  Standing: Intact    Functional Mobility and Transfers for ADLs:  Bed Mobility:  Supine to Sit: Independent  Sit to Supine: Independent    Transfers:  Sit to Stand: Independent  Stand to Sit: Independent  Bed to Chair: Independent  Bathroom Mobility: Independent  Toilet Transfer : Independent    ADL Assessment:  Feeding: Independent    Oral Facial Hygiene/Grooming: Independent    Bathing: Minimum assistance(for upper body bathing activities)    Upper Body Dressing: Minimum assistance    Lower Body Dressing: Minimum assistance    Toileting: Independent                ADL Intervention and task modifications:     Dressing Training:  Pt received education and training for jackelyn dressing techniques following shoulder replacement surgery. Pt provided education for donning button down shirts as well and pull over cotton shirts. Button down shirts: educated to don surgical arm first and then to adjust over his back and don strong arm. Pt educated that he can use both hands to button shirts. T-shirt: educated pt to don over surgical arm first, then adjust over head, and then don strong arm in shirt. Educated patient that a size larger does make this a little easier for home. Overall complete upper body dressing with teresa CULLEN. Also received education and training for how to properly don abduction wedge sling. Adjust sling for proper comfort and educated pt how to adjust strapping for home. Cognitive Retraining  Safety/Judgement: Good awareness of safety precautions    Therapeutic Exercise:  Pendulum exercises for LUE completed standing with independence using the counter for support. Exercises including gentle shoulder flexion, abduction/adduction, clockwise circles, and counter-clockwise circles.   Pt instructed and demonstrated neck stretching exercises. Pt completed elbow flexion, supination/prongation, wrist flexion/extension, and active hand flexion/extension. Pt tolerated exercises well and iced following intervention. Provided pt with written instructions for reference for home. Recommend pt to complete 10 reps x 3 sets daily. Functional Measure:  Barthel Index:    Bathin  Bladder: 10  Bowels: 10  Groomin  Dressin  Feeding: 10  Mobility: 15  Stairs: 10  Toilet Use: 10  Transfer (Bed to Chair and Back): 15  Total: 90/100        The Barthel ADL Index: Guidelines  1. The index should be used as a record of what a patient does, not as a record of what a patient could do. 2. The main aim is to establish degree of independence from any help, physical or verbal, however minor and for whatever reason. 3. The need for supervision renders the patient not independent. 4. A patient's performance should be established using the best available evidence. Asking the patient, friends/relatives and nurses are the usual sources, but direct observation and common sense are also important. However direct testing is not needed. 5. Usually the patient's performance over the preceding 24-48 hours is important, but occasionally longer periods will be relevant. 6. Middle categories imply that the patient supplies over 50 per cent of the effort. 7. Use of aids to be independent is allowed. Eunice Morales., Barthel, D.W. (1280). Functional evaluation: the Barthel Index. [facility] (14)2. SUNSHINE Hubbard, Marlen Vincent., Threasa Hammans., [address] (). Measuring the change indisability after inpatient rehabilitation; comparison of the responsiveness of the Barthel Index and Functional [place] Measure. Journal of Neurology, Neurosurgery, and Psychiatry, 66(4), 578-280. Rashid Fleming, N.J.A, JOAQUINA Jordan.EJ, & Romero Chowdhury MEstherA. (2004.) Assessment of post-stroke quality of life in cost-effectiveness studies:  The usefulness of the Barthel Index and the EuroQoL-5D. Quality of Life Research, 15, 126-09       Occupational Therapy Evaluation Charge Determination   History Examination Decision-Making   LOW Complexity : Brief history review  LOW Complexity : 1-3 performance deficits relating to physical, cognitive , or psychosocial skils that result in activity limitations and / or participation restrictions  LOW Complexity : No comorbidities that affect functional and no verbal or physical assistance needed to complete eval tasks       Based on the above components, the patient evaluation is determined to be of the following complexity level: LOW   Pain Rating:  Minimal pain    Activity Tolerance:   Good    After treatment patient left in no apparent distress:    Sitting in chair and Call bell within reach    COMMUNICATION/EDUCATION:   The patients plan of care was discussed with: Physical therapist and Registered nurse.      Thank you for this referral.  Dev Severino OT  Time Calculation: 62 mins

## 2021-05-18 PROBLEM — Z79.899 ENCOUNTER FOR LONG-TERM (CURRENT) USE OF OTHER MEDICATIONS: Status: ACTIVE | Noted: 2021-05-18

## 2021-11-11 ENCOUNTER — OFFICE VISIT (OUTPATIENT)
Dept: ORTHOPEDIC SURGERY | Age: 71
End: 2021-11-11

## 2021-11-11 DIAGNOSIS — M25.511 ACUTE PAIN OF RIGHT SHOULDER: Primary | ICD-10-CM

## 2021-11-11 DIAGNOSIS — M19.011 PRIMARY OSTEOARTHRITIS OF RIGHT SHOULDER: ICD-10-CM

## 2021-11-11 PROCEDURE — 99214 OFFICE O/P EST MOD 30 MIN: CPT | Performed by: ORTHOPAEDIC SURGERY

## 2021-11-11 PROCEDURE — G8427 DOCREV CUR MEDS BY ELIG CLIN: HCPCS | Performed by: ORTHOPAEDIC SURGERY

## 2021-11-11 PROCEDURE — G8419 CALC BMI OUT NRM PARAM NOF/U: HCPCS | Performed by: ORTHOPAEDIC SURGERY

## 2021-11-11 PROCEDURE — 1101F PT FALLS ASSESS-DOCD LE1/YR: CPT | Performed by: ORTHOPAEDIC SURGERY

## 2021-11-11 PROCEDURE — G8536 NO DOC ELDER MAL SCRN: HCPCS | Performed by: ORTHOPAEDIC SURGERY

## 2021-11-11 PROCEDURE — G8432 DEP SCR NOT DOC, RNG: HCPCS | Performed by: ORTHOPAEDIC SURGERY

## 2021-11-11 PROCEDURE — 3017F COLORECTAL CA SCREEN DOC REV: CPT | Performed by: ORTHOPAEDIC SURGERY

## 2021-11-11 NOTE — PROGRESS NOTES
Dylan Gusman (: 1950) is a 73 y.o. male, patient, here for evaluation of the following chief complaint(s):  Shoulder Pain       HPI:    Patient presents to the office today with a chief complaint of right shoulder pain. Is a patient well-known to my office he is status post total shoulder replacement in the left. He is doing quite well with the left. He was doing some exercises on his right shoulder and subsequently developed some level of discomfort in the right shoulder. He describes a sharp light sensation very similar to what his left shoulder felt when he had arthritis. Since that time, he has gotten a little bit better. His range of motion was not decreased. He has noted some snapping and popping in the shoulder for quite some time. The snapping and popping is not necessarily more painful than what he has had before. Allergies   Allergen Reactions    Codeine Nausea and Vomiting       Current Outpatient Medications   Medication Sig    lisinopriL (PRINIVIL, ZESTRIL) 40 mg tablet Take 40 mg by mouth daily.  hydrALAZINE (APRESOLINE) 50 mg tablet Take 50 mg by mouth three (3) times daily.  tadalafiL (Cialis) 5 mg tablet Take 1 Tab by mouth daily.  amoxicillin (AMOXIL) 500 mg capsule TAKE FOUR CAPSULES BY MOUTH AT ONCE AS ONE DOSE 1 HOUR BEFORE DENTAL WORK    atorvastatin (LIPITOR) 40 mg tablet Take 40 mg by mouth daily.  loratadine (CLARITIN) 10 mg tablet Take 10 mg by mouth daily.  cholecalciferol, vitamin D3, (Vitamin D3) 50 mcg (2,000 unit) tab Take  by mouth daily.  amLODIPine (NORVASC) 5 mg tablet Take 5 mg by mouth two (2) times a day.  aspirin delayed-release 81 mg tablet Take 1 Tab by mouth two (2) times a day. Indications: blood clot prevention (Patient taking differently: Take 81 mg by mouth daily. Indications: blood clot prevention)     No current facility-administered medications for this visit.        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    Organ donor     ON BRAIN DONOR LIST    Prostate CA ([facility] [address]     active surveillance        Past Surgical History:   Procedure Laterality Date    HX GI      COLONOSCOPY    HX HEENT      WISDOM TEETH     HX HEENT      BCCA REMOVED FROM FACE     HX HIP REPLACEMENT Right     HX OTHER SURGICAL  2020    BCCA REMOVED FROM LEFT CHEST     HX POLYPECTOMY      [place] CARDIAC SURG PROCEDURE UNLIST  02/2021    CARDIAC CATHETERIZATION     [place] PROSTATE BIOPSY, NEEDLE, SATURATION SAMPLING  2020    [place] TOTAL HIP ARTHROPLASTY Right 03/17/2020       Family History   Problem Relation Age of Onset    Colon Cancer Mother 77    Lung Cancer Father 77        smoker    Coronary Art Dis Father     Cancer Sister         brain/bone cancer -- ?primary    Anesth Problems Neg Hx         Social History     Socioeconomic History    Marital status:      Spouse name: Not on file    Number of children: Not on file    Years of education: Not on file    Highest education level: Not on file   Occupational History    Occupation: investments   Tobacco Use    Smoking status: Never Smoker    Smokeless tobacco: Never Used   Vaping Use    Vaping Use: Never used   Substance and Sexual Activity    Alcohol use:  Yes     Alcohol/week: 7.0 standard drinks     Types: 7 Shots of liquor per week    Drug use: Never    Sexual activity: Not Currently   Other Topics Concern    Not on file   Social History Narrative    Not on file     Social Determinants of Health     Financial Resource Strain:     Difficulty of Paying Living Expenses: Not on file   Food Insecurity:     Worried About Running Out of Food in the Last Year: Not on file    [address] [facility] [address] in the Last Year: Not on file   Transportation Needs:     Lack of Transportation (Medical): Not on file    Lack of Transportation (Non-Medical): Not on file   Physical Activity:     Days of Exercise per Week: Not on file    Minutes of Exercise per Session: Not on file   Stress:     Feeling of Stress : Not on file   Social Connections:     Frequency of Communication with Friends and Family: Not on file    Frequency of Social Gatherings with Friends and Family: Not on file    Attends Mandaeism Services: Not on file    Active Member of [address] [place] [address] [employer] or Organizations: Not on file    Attends Club or Organization Meetings: Not on file    Marital Status: Not on file   Intimate Partner Violence:     Fear of Current or Ex-Partner: Not on file    Emotionally Abused: Not on file    Physically Abused: Not on file    Sexually Abused: Not on file   Housing Stability:     Unable to Pay for Housing in the Last Year: Not on file    Number of [place] in the Last Year: Not on file    Unstable Housing in the Last Year: Not on file       Review of Systems   Constitutional: Negative. HENT: Negative. Eyes: Negative. Cardiovascular: Negative. Gastrointestinal: Negative. Endocrine: Negative. Genitourinary: Negative. Musculoskeletal: Negative. Shoulder pain   Skin: Negative. Allergic/Immunologic: Negative. Neurological: Negative. Hematological: Negative. Psychiatric/Behavioral: Negative. All other systems reviewed and are negative. Vitals: There were no vitals taken for this visit. There is no height or weight on file to calculate BMI. Ortho Exam     Right shoulder: No deformity is noted. He has no effusion. Patient has 130 degrees of forward elevation, 100 degrees of lateral duction and 65 degrees of external rotation. Patient has passive range of motion to his SI joint. He has positive crepitation with both active and passive range of motion.   Strength is maintained with forward elevation, lateral abduction and external rotation. Speeds test is negative. He has no swelling noted distally. Neurovascular examination is intact. Left shoulder: Incision from prior surgery is healed well. Patient has no effusion. He has near full range of motion of the shoulder. Patient has no pain with manipulation of the shoulder. Strength is maintained. Neurovascular examination is intact. X-ray evaluation reveals bone-on-bone arthritis of the right shoulder. Patient does have some narrowing of the acromiohumeral joint most of this is secondary to acromioclavicular arthropathy and bone spur formation. ASSESSMENT/PLAN:    Patient presents to the office today with evidence of primary osteoarthritis of the right shoulder. He is obviously familiar with treatment of this. We talked about nonoperative treatment including cortisone injection. At this stage, he states it is getting a little bit better so he would like to stay away from cortisone. I concur. He has taken occasional Tylenol this seems to help him. I have asked him to modify his activity over the next 10 days. If his pain does not fully resolve, I have asked him to return to the office we may consider cortisone at that time.         Dyanne Habermann, MD

## 2022-03-15 ENCOUNTER — OFFICE VISIT (OUTPATIENT)
Dept: ORTHOPEDIC SURGERY | Age: 72
End: 2022-03-15
Payer: MEDICARE

## 2022-03-15 DIAGNOSIS — Z96.612 HISTORY OF TOTAL SHOULDER REPLACEMENT, LEFT: Primary | ICD-10-CM

## 2022-03-15 DIAGNOSIS — G89.29 CHRONIC LEFT SHOULDER PAIN: ICD-10-CM

## 2022-03-15 DIAGNOSIS — M25.512 CHRONIC LEFT SHOULDER PAIN: ICD-10-CM

## 2022-03-15 DIAGNOSIS — M19.011 PRIMARY OSTEOARTHRITIS OF RIGHT SHOULDER: ICD-10-CM

## 2022-03-15 DIAGNOSIS — M25.512 PAIN IN JOINT OF LEFT SHOULDER: ICD-10-CM

## 2022-03-15 PROCEDURE — G8427 DOCREV CUR MEDS BY ELIG CLIN: HCPCS | Performed by: ORTHOPAEDIC SURGERY

## 2022-03-15 PROCEDURE — G8419 CALC BMI OUT NRM PARAM NOF/U: HCPCS | Performed by: ORTHOPAEDIC SURGERY

## 2022-03-15 PROCEDURE — G8536 NO DOC ELDER MAL SCRN: HCPCS | Performed by: ORTHOPAEDIC SURGERY

## 2022-03-15 PROCEDURE — 99214 OFFICE O/P EST MOD 30 MIN: CPT | Performed by: ORTHOPAEDIC SURGERY

## 2022-03-15 PROCEDURE — 3017F COLORECTAL CA SCREEN DOC REV: CPT | Performed by: ORTHOPAEDIC SURGERY

## 2022-03-15 PROCEDURE — 1101F PT FALLS ASSESS-DOCD LE1/YR: CPT | Performed by: ORTHOPAEDIC SURGERY

## 2022-03-15 PROCEDURE — G8432 DEP SCR NOT DOC, RNG: HCPCS | Performed by: ORTHOPAEDIC SURGERY

## 2022-03-15 NOTE — LETTER
3/15/2022    Patient: Teri Vega   YOB: 1950   Date of Visit: 3/15/2022     Zulma Aviles MD  [address] [id number]  [place]    Dear Zulma Aviles MD,      Thank you for referring Mr. Hunter Newell to [facility] for evaluation. My notes for this consultation are attached. If you have questions, please do not hesitate to call me. I look forward to following your patient along with you.       Sincerely,    Shonda Morton MD

## 2022-03-15 NOTE — PROGRESS NOTES
Sarah Vega (: 1950) is a 81 y.o. male, patient, here for evaluation of the following chief complaint(s):  Shoulder Pain (left total shoulder follow up)       HPI:    Patient presents the office today with a chief complaint of right shoulder pain. He is here today actually for his left shoulder status post left total shoulder replacement. He is doing quite well. His right shoulder remains to be problematic and painful. He describes discomfort across the outer part of shoulder. He has pain with reaching up over his head and reaching behind his back. He denies numbness or tingling. Allergies   Allergen Reactions    Codeine Nausea and Vomiting       Current Outpatient Medications   Medication Sig    lisinopriL (PRINIVIL, ZESTRIL) 40 mg tablet Take 40 mg by mouth daily.  hydrALAZINE (APRESOLINE) 50 mg tablet Take 50 mg by mouth three (3) times daily.  tadalafiL (Cialis) 5 mg tablet Take 1 Tab by mouth daily.  amoxicillin (AMOXIL) 500 mg capsule TAKE FOUR CAPSULES BY MOUTH AT ONCE AS ONE DOSE 1 HOUR BEFORE DENTAL WORK    atorvastatin (LIPITOR) 40 mg tablet Take 40 mg by mouth daily.  loratadine (CLARITIN) 10 mg tablet Take 10 mg by mouth daily.  cholecalciferol, vitamin D3, (Vitamin D3) 50 mcg (2,000 unit) tab Take  by mouth daily.  amLODIPine (NORVASC) 5 mg tablet Take 5 mg by mouth two (2) times a day.  aspirin delayed-release 81 mg tablet Take 1 Tab by mouth two (2) times a day. Indications: blood clot prevention (Patient taking differently: Take 81 mg by mouth daily. Indications: blood clot prevention)     No current facility-administered medications for this visit.        Past Medical History:   Diagnosis Date    Arthritis     Basal cell carcinoma 2021    FACE     Basal cell carcinoma (BCC) of chest 2019    LEFT SIDE     CAD (coronary artery disease)     Chronic pain     DM (diabetes mellitus) ([facility] [address]     NOT ON MEDICATION    Hypertension     [facility] 3/9/2020    Organ donor     ON BRAIN DONOR LIST    Prostate CA [facility])     active surveillance        Past Surgical History:   Procedure Laterality Date    HX GI      COLONOSCOPY    HX HEENT      WISDOM TEETH     HX HEENT      BCCA REMOVED FROM FACE     HX HIP REPLACEMENT Right     HX OTHER SURGICAL  2020    BCCA REMOVED FROM LEFT CHEST     HX POLYPECTOMY      CA CARDIAC SURG PROCEDURE UNLIST  02/2021    CARDIAC CATHETERIZATION     CA PROSTATE BIOPSY, NEEDLE, SATURATION SAMPLING  2020    CA TOTAL HIP ARTHROPLASTY Right 03/17/2020       Family History   Problem Relation Age of Onset    Colon Cancer Mother 77    Lung Cancer Father 77        smoker    Coronary Art Dis Father    [employer] Cancer Sister         brain/bone cancer -- ?primary    Anesth Problems Neg Hx         Social History     Socioeconomic History    Marital status:      Spouse name: Not on file    Number of children: Not on file    Years of education: Not on file    Highest education level: Not on file   Occupational History    Occupation: investments   Tobacco Use    Smoking status: Never Smoker    Smokeless tobacco: Never Used   Vaping Use    Vaping Use: Never used   Substance and Sexual Activity    Alcohol use: Yes     Alcohol/week: 7.0 standard drinks     Types: 7 Shots of liquor per week    Drug use: Never    Sexual activity: Not Currently   Other Topics Concern    Not on file   Social History Narrative    Not on file     Social Determinants of Health     Financial Resource Strain:     Difficulty of Paying Living Expenses: Not on file   Food Insecurity:     Worried About Running Out of Food in the Last Year: Not on file    Susi of Food in the Last Year: Not on file   Transportation Needs:     Lack of Transportation (Medical): Not on file    Lack of Transportation (Non-Medical):  Not on file   Physical Activity:     Days of Exercise per Week: Not on file    Minutes of Exercise per Session: Not on file   Stress:     Feeling of Stress : Not on file   Social Connections:     Frequency of Communication with Friends and Family: Not on file    Frequency of Social Gatherings with Friends and Family: Not on file    Attends Confucianist Services: Not on file    Active Member of Clubs or Organizations: Not on file    Attends Club or Organization Meetings: Not on file    Marital Status: Not on file   Intimate Partner Violence:     Fear of Current or Ex-Partner: Not on file    Emotionally Abused: Not on file    Physically Abused: Not on file    Sexually Abused: Not on file   Housing Stability:     Unable to Pay for Housing in the Last Year: Not on file    Number of [place] in the Last Year: Not on file    Unstable Housing in the Last Year: Not on file       Review of Systems   Musculoskeletal:        Left total shoulder arthroplasty       Vitals: There were no vitals taken for this visit. There is no height or weight on file to calculate BMI. Ortho Exam     Left shoulder: Incision is healed. He has near full range of motion of the shoulder. He has no pain. He has no crepitation. He has normal rotator cuff strength. He has no swelling noted distally. Neurovascular examination is intact. Right shoulder: Patient has limited range of motion with 120 degrees of forward elevation, 100 degrees lateral duction and 60 degrees of external rotation. He has positive crepitation throughout. He has no swelling noted distally. Neurovascular examination is intact. XR Results (most recent):  Results from Appointment encounter on 03/15/22    XR SHOULDER LT AP/LAT MIN 2 V    Narrative  Three-view x-ray of the left shoulder reveals a well-seated prosthesis no lucencies identified. There has been no interval change from his prior x-ray       ASSESSMENT/PLAN:    Patient is doing very well in regards to the left shoulder.   He was reminded to take antibiotics prior to dental procedures. He is to return the office in 1 year for repeat x-ray. In regards to the right shoulder, his pain levels a little bit more than what is normally noted with primary osteoarthritis. Therefore, I would suggest proceeding with an MRI evaluation of the shoulder.   Patient agrees to this and is to return to the office after the Natasha Bass MD

## 2022-03-18 PROBLEM — Z79.899 ENCOUNTER FOR LONG-TERM (CURRENT) USE OF OTHER MEDICATIONS: Status: ACTIVE | Noted: 2021-05-18

## 2022-03-19 PROBLEM — E11.59 HYPERTENSION COMPLICATING DIABETES (HCC): Status: ACTIVE | Noted: 2020-05-07

## 2022-03-19 PROBLEM — R07.89 OTHER CHEST PAIN: Status: ACTIVE | Noted: 2021-01-14

## 2022-03-19 PROBLEM — E11.9 TYPE II DIABETES MELLITUS (HCC): Status: ACTIVE | Noted: 2020-03-17

## 2022-03-19 PROBLEM — M19.019 OA (OSTEOARTHRITIS) OF SHOULDER: Status: ACTIVE | Noted: 2021-03-11

## 2022-03-19 PROBLEM — M16.11 PRIMARY LOCALIZED OSTEOARTHRITIS OF RIGHT HIP: Status: ACTIVE | Noted: 2020-03-17

## 2022-03-19 PROBLEM — I15.2 HYPERTENSION COMPLICATING DIABETES (HCC): Status: ACTIVE | Noted: 2020-05-07

## 2022-03-24 ENCOUNTER — HOSPITAL ENCOUNTER (OUTPATIENT)
Dept: MRI IMAGING | Age: 72
Discharge: HOME | End: 2022-03-24
Attending: ORTHOPAEDIC SURGERY
Payer: MEDICARE

## 2022-03-24 DIAGNOSIS — M19.011 PRIMARY OSTEOARTHRITIS OF RIGHT SHOULDER: ICD-10-CM

## 2022-03-24 PROCEDURE — 73221 MRI JOINT UPR EXTREM W/O DYE: CPT

## 2022-04-12 ENCOUNTER — OFFICE VISIT (OUTPATIENT)
Dept: ORTHOPEDIC SURGERY | Age: 72
End: 2022-04-12
Payer: MEDICARE

## 2022-04-12 DIAGNOSIS — M19.011 PRIMARY OSTEOARTHRITIS OF RIGHT SHOULDER: Primary | ICD-10-CM

## 2022-04-12 PROCEDURE — G8536 NO DOC ELDER MAL SCRN: HCPCS | Performed by: ORTHOPAEDIC SURGERY

## 2022-04-12 PROCEDURE — 3017F COLORECTAL CA SCREEN DOC REV: CPT | Performed by: ORTHOPAEDIC SURGERY

## 2022-04-12 PROCEDURE — 1101F PT FALLS ASSESS-DOCD LE1/YR: CPT | Performed by: ORTHOPAEDIC SURGERY

## 2022-04-12 PROCEDURE — G8427 DOCREV CUR MEDS BY ELIG CLIN: HCPCS | Performed by: ORTHOPAEDIC SURGERY

## 2022-04-12 PROCEDURE — G8419 CALC BMI OUT NRM PARAM NOF/U: HCPCS | Performed by: ORTHOPAEDIC SURGERY

## 2022-04-12 PROCEDURE — G8432 DEP SCR NOT DOC, RNG: HCPCS | Performed by: ORTHOPAEDIC SURGERY

## 2022-04-12 PROCEDURE — 99214 OFFICE O/P EST MOD 30 MIN: CPT | Performed by: ORTHOPAEDIC SURGERY

## 2022-04-12 NOTE — PROGRESS NOTES
Daren Quigley (: 1950) is a 81 y.o. male, patient, here for evaluation of the following chief complaint(s):  Shoulder Pain (shoulder pain)       HPI:    Mr. Nivia Murdock presents today for reevaluation of his left shoulder and also MRI results from his right shoulder. States that he is really happy with how his left shoulder is doing since surgery. In regards to the right shoulder he continues to have significant pain in the right shoulder and difficulty with daily activities. Allergies   Allergen Reactions    Codeine Nausea and Vomiting       Current Outpatient Medications   Medication Sig    lisinopriL (PRINIVIL, ZESTRIL) 40 mg tablet Take 40 mg by mouth daily.  hydrALAZINE (APRESOLINE) 50 mg tablet Take 50 mg by mouth three (3) times daily.  tadalafiL (Cialis) 5 mg tablet Take 1 Tab by mouth daily.  amoxicillin (AMOXIL) 500 mg capsule TAKE FOUR CAPSULES BY MOUTH AT ONCE AS ONE DOSE 1 HOUR BEFORE DENTAL WORK    atorvastatin (LIPITOR) 40 mg tablet Take 40 mg by mouth daily.  loratadine (CLARITIN) 10 mg tablet Take 10 mg by mouth daily.  cholecalciferol, vitamin D3, (Vitamin D3) 50 mcg (2,000 unit) tab Take  by mouth daily.  amLODIPine (NORVASC) 5 mg tablet Take 5 mg by mouth two (2) times a day.  aspirin delayed-release 81 mg tablet Take 1 Tab by mouth two (2) times a day. Indications: blood clot prevention (Patient taking differently: Take 81 mg by mouth daily. Indications: blood clot prevention)     No current facility-administered medications for this visit.        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    Organ donor     ON BRAIN DONOR LIST    Prostate CA ([facility] [address]     active surveillance        Past Surgical History:   Procedure Laterality Date    HX GI      COLONOSCOPY    HX HEENT      WISDOM TEETH     HX HEENT      BCCA REMOVED FROM FACE     HX HIP REPLACEMENT Right     HX OTHER SURGICAL  2020    BCCA REMOVED FROM LEFT CHEST     HX POLYPECTOMY      MT CARDIAC SURG PROCEDURE UNLIST  02/2021    CARDIAC CATHETERIZATION     MT PROSTATE BIOPSY, NEEDLE, SATURATION SAMPLING  2020    MT TOTAL HIP ARTHROPLASTY Right 03/17/2020       Family History   Problem Relation Age of Onset    Colon Cancer Mother 77    Lung Cancer Father 77        smoker    Coronary Art Dis Father    [facility] Cancer Sister         brain/bone cancer -- ?primary    Anesth Problems Neg Hx         Social History     Socioeconomic History    Marital status:      Spouse name: Not on file    Number of children: Not on file    Years of education: Not on file    Highest education level: Not on file   Occupational History    Occupation: investments   Tobacco Use    Smoking status: Never Smoker    Smokeless tobacco: Never Used   Vaping Use    Vaping Use: Never used   Substance and Sexual Activity    Alcohol use: Yes     Alcohol/week: 7.0 standard drinks     Types: 7 Shots of liquor per week    Drug use: Never    Sexual activity: Not Currently   Other Topics Concern    Not on file   Social History Narrative    Not on file     Social Determinants of Health     Financial Resource Strain:     Difficulty of Paying Living Expenses: Not on file   Food Insecurity:     Worried About Running Out of Food in the Last Year: Not on file    Susi of Food in the Last Year: Not on file   Transportation Needs:     Lack of Transportation (Medical): Not on file    Lack of Transportation (Non-Medical):  Not on file   Physical Activity:     Days of Exercise per Week: Not on file    Minutes of Exercise per Session: Not on file   Stress:     Feeling of Stress : Not on file   Social Connections:     Frequency of Communication with Friends and Family: Not on file    Frequency of Social Gatherings with Friends and Family: Not on file    Attends Rastafari Services: Not on file    Active Member of Clubs or Organizations: Not on file    Attends Club or Organization Meetings: Not on file    Marital Status: Not on file   Intimate Partner Violence:     Fear of Current or Ex-Partner: Not on file    Emotionally Abused: Not on file    Physically Abused: Not on file    Sexually Abused: Not on file   Housing Stability:     Unable to Pay for Housing in the Last Year: Not on file    Number of [place] in the Last Year: Not on file    Unstable Housing in the Last Year: Not on file       Review of Systems   Musculoskeletal:        Shoulder pain       Vitals: There were no vitals taken for this visit. There is no height or weight on file to calculate BMI. Ortho Exam     Left shoulder: Incision is healed. He has near full range of motion of the shoulder. He has no pain. He has no crepitation. He has normal rotator cuff strength. He has no swelling noted distally. Neurovascular examination is intact.     Right shoulder: Patient has limited range of motion with 120 degrees of forward elevation, 100 degrees lateral duction and 60 degrees of external rotation. He has positive crepitation throughout. He has no swelling noted distally. Neurovascular examination is intact. MRI evaluation shows evidence of significant and severe osteoarthritis of glenohumeral joint. Mild age-related changes noted to the rotator cuff but no tears noted. Loose body debris typically noted with this level of arthritis        ASSESSMENT/PLAN:    We discussed with Mr. Harriett Cross that based on the MRI and his complaints, he would be well suited for a right total shoulder arthroplasty. He is just previously underwent this on the left and did very well from this.   He states that he has some other medical issues to work out regarding his prostate. Once he gets this straightened out, he will call to proceed with CT scan of the right shoulder for navigation purposes. I refreshed his memory in regards to shoulder replacement. I have gone over the risks and benefits. The risks and benefits were described to the patient. Patient understands there is a risk of infection, postoperative pain, numbness, tingling, stiffness MI, DVT, PE, and other unforeseen events. The patient also understands there is a long rehabilitative process that typically follows the surgical procedure. We talked about the possibility of not being able to alleviate all of the discomfort. Also, I explained  there is no guarantee all the function and strength will return. The patient also understands the risks of re-tear or failure to heal.  The patient understands implants may be utilized during this surgery. The patient also understands the generalized, associated risk of anesthetic and wishes to proceed in an elective fashion. Patient is to call when he is ready to proceed. Stella Johns MD

## 2022-04-12 NOTE — LETTER
4/13/2022    Patient: Abhishek Ambriz   YOB: 1950   Date of Visit: 4/12/2022     Sonny Hay MD  [address] [id number]  [address] [facility] [address]    Dear Sonny Hay MD,      Thank you for referring Mr. Yoan Mena to [facility] for evaluation. My notes for this consultation are attached. If you have questions, please do not hesitate to call me. I look forward to following your patient along with you.       Sincerely,    Amara Santoro MD

## 2022-06-01 ENCOUNTER — HOSPITAL ENCOUNTER (OUTPATIENT)
Dept: RADIATION THERAPY | Age: 72
Discharge: HOME | End: 2022-06-01

## 2022-07-06 ENCOUNTER — HOSPITAL ENCOUNTER (OUTPATIENT)
Dept: MRI IMAGING | Age: 72
Discharge: HOME | End: 2022-07-06
Attending: RADIOLOGY
Payer: MEDICARE

## 2022-07-06 DIAGNOSIS — C61 PROSTATE CANCER (HCC): ICD-10-CM

## 2022-07-06 PROCEDURE — 76498 UNLISTED MR PROCEDURE: CPT

## 2022-07-26 ENCOUNTER — HOSPITAL ENCOUNTER (OUTPATIENT)
Dept: RADIATION THERAPY | Age: 72
Discharge: HOME | End: 2022-07-26

## 2022-07-28 ENCOUNTER — HOSPITAL ENCOUNTER (OUTPATIENT)
Dept: RADIATION THERAPY | Age: 72
Discharge: HOME | End: 2022-07-28

## 2022-08-01 ENCOUNTER — HOSPITAL ENCOUNTER (OUTPATIENT)
Dept: RADIATION THERAPY | Age: 72
Discharge: HOME | End: 2022-08-01

## 2022-08-03 ENCOUNTER — HOSPITAL ENCOUNTER (OUTPATIENT)
Dept: RADIATION THERAPY | Age: 72
Discharge: HOME | End: 2022-08-03

## 2022-08-05 ENCOUNTER — HOSPITAL ENCOUNTER (OUTPATIENT)
Dept: RADIATION THERAPY | Age: 72
Discharge: HOME | End: 2022-08-05

## 2022-09-12 PROBLEM — D69.2 SENILE PURPURA (HCC): Status: ACTIVE | Noted: 2022-09-12

## 2022-12-16 ENCOUNTER — HOSPITAL ENCOUNTER (OUTPATIENT)
Dept: RADIATION THERAPY | Age: 72
Discharge: HOME | End: 2022-12-16

## 2023-01-11 ENCOUNTER — OFFICE VISIT (OUTPATIENT)
Dept: ORTHOPEDIC SURGERY | Age: 73
End: 2023-01-11
Payer: MEDICARE

## 2023-01-11 VITALS — WEIGHT: 185 LBS | BODY MASS INDEX: 25.9 KG/M2 | HEIGHT: 71 IN

## 2023-01-11 DIAGNOSIS — M17.11 PRIMARY OSTEOARTHRITIS OF RIGHT KNEE: ICD-10-CM

## 2023-01-11 DIAGNOSIS — M25.461 KNEE EFFUSION, RIGHT: ICD-10-CM

## 2023-01-11 DIAGNOSIS — M25.561 ACUTE PAIN OF RIGHT KNEE: Primary | ICD-10-CM

## 2023-01-11 RX ORDER — BUPIVACAINE HYDROCHLORIDE 2.5 MG/ML
5 INJECTION, SOLUTION INFILTRATION; PERINEURAL ONCE
Status: COMPLETED | OUTPATIENT
Start: 2023-01-11 | End: 2023-01-11

## 2023-01-11 RX ORDER — METHYLPREDNISOLONE ACETATE 80 MG/ML
80 INJECTION, SUSPENSION INTRA-ARTICULAR; INTRALESIONAL; INTRAMUSCULAR; SOFT TISSUE ONCE
Status: COMPLETED | OUTPATIENT
Start: 2023-01-11 | End: 2023-01-11

## 2023-01-11 RX ADMIN — BUPIVACAINE HYDROCHLORIDE 12.5 MG: 2.5 INJECTION, SOLUTION INFILTRATION; PERINEURAL at 12:25

## 2023-01-11 RX ADMIN — METHYLPREDNISOLONE ACETATE 80 MG: 80 INJECTION, SUSPENSION INTRA-ARTICULAR; INTRALESIONAL; INTRAMUSCULAR; SOFT TISSUE at 12:25

## 2023-01-11 NOTE — PROGRESS NOTES
Leslie Lugo (: 1950) is a 81 y.o. male, patient, here for evaluation of the following chief complaint(s):  Knee Pain (Right knee pain ) and Hip Pain (Right hip pain )       HPI:    79-year-old male presents with chief complaint of right knee pain. Patient is well-known for us for bilateral shoulder arthritis of which we have replaced his left shoulder. He also had a right hip replacement by Dr. Alma Rosa Rivera. He is now having pretty severe medial sided right knee pain that occurred with no injury. He has not been experiencing this pain for few months and has worsened. He is also noticed an effusion in that knee. He has no previous injuries or surgeries to this knee. Patient has noted tightness of the knee with some on and off swelling in the knee joint. Allergies   Allergen Reactions    Codeine Nausea and Vomiting       Current Outpatient Medications   Medication Sig    atorvastatin (LIPITOR) 40 mg tablet Take 1 Tablet by mouth daily. metFORMIN (GLUCOPHAGE) 500 mg tablet Take 1 Tablet by mouth two (2) times daily (with meals). lisinopriL (PRINIVIL, ZESTRIL) 40 mg tablet Take 40 mg by mouth daily. hydrALAZINE (APRESOLINE) 50 mg tablet Take 50 mg by mouth daily. tadalafiL (Cialis) 5 mg tablet Take 1 Tab by mouth daily. amoxicillin (AMOXIL) 500 mg capsule TAKE FOUR CAPSULES BY MOUTH AT ONCE AS ONE DOSE 1 HOUR BEFORE DENTAL WORK    loratadine (CLARITIN) 10 mg tablet Take 10 mg by mouth daily. cholecalciferol, vitamin D3, (Vitamin D3) 50 mcg (2,000 unit) tab Take  by mouth daily. amLODIPine (NORVASC) 5 mg tablet Take 5 mg by mouth daily. aspirin delayed-release 81 mg tablet Take 1 Tab by mouth two (2) times a day. Indications: blood clot prevention (Patient taking differently: Take 81 mg by mouth daily. Indications: blood clot prevention)     No current facility-administered medications for this visit.        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 03/09/2020    Organ donor     ON BRAIN DONOR LIST    Prostate CA ([facility] [address]     XRT        Past Surgical History:   Procedure Laterality Date    HX GI      COLONOSCOPY    HX HEENT      WISDOM TEETH     HX HEENT      BCCA REMOVED FROM FACE     HX HIP REPLACEMENT Right     HX OTHER SURGICAL  2020    BCCA REMOVED FROM LEFT CHEST     HX POLYPECTOMY      CA CARDIAC SURG PROCEDURE UNLIST  02/2021    CARDIAC CATHETERIZATION     CA PROSTATE BIOPSY, NEEDLE, SATURATION SAMPLING  2020    CA TOTAL HIP ARTHROPLASTY Right 03/17/2020       Family History   Problem Relation Age of Onset    Colon Cancer Mother 77    Lung Cancer Father 77        smoker    Coronary Art Dis Father     Cancer Sister         brain/bone cancer -- ?primary    Ovarian Cancer Sister     Anesth Problems Neg Hx         Social History     Socioeconomic History    Marital status:      Spouse name: Not on file    Number of children: Not on file    Years of education: Not on file    Highest education level: Not on file   Occupational History    Occupation: investments   Tobacco Use    Smoking status: Never    Smokeless tobacco: Never   Vaping Use    Vaping Use: Never used   Substance and Sexual Activity    Alcohol use:  Yes     Alcohol/week: 7.0 standard drinks     Types: 7 Shots of liquor per week    Drug use: Never    Sexual activity: Not Currently   Other Topics Concern    Not on file   Social History Narrative    Not on file     Social Determinants of Health     Financial Resource Strain: Not on file   Food Insecurity: Not on file   Transportation Needs: Not on file   Physical Activity: Not on file   Stress: Not on file   Social Connections: Not on file   Intimate Partner Violence: Not on file   Housing Stability: Not on file Review of Systems   Musculoskeletal:         Right knee and right hip pain     Vitals:  Ht 5' 11\" (1.803 m)   Wt 185 lb (83.9 kg)   BMI 25.80 kg/m²    Body mass index is 25.8 kg/m². Ortho Exam     Patient is alert and oriented x3. Patient is in no acute distress. Patient ambulates with a nonantalgic gait. Right knee: 2+ effusion is noted. Patient has range of motion +5 -130 degrees. There is mild pain with manipulation the patella. Positive crepitation with manipulation of the patella. Positive medial joint line tenderness is noted. Patient's pain is slightly worsened with Chandan's maneuver. There is no lateral joint tenderness. Collateral ligaments are intact. Anterior drawer and posterior drawer negative. Lachman's test negative. There is no soft tissue swelling distally. Neurovascular examination is intact. Left knee: There is no abrasions, lacerations, ecchymosis or soft tissue swelling. No effusion is identified. There is no pain to palpation along the medial or lateral border of the patella. There is no pain or crepitation with manipulation of the patella. There is normal excursion of the patella. Patellar grind test is negative. Active and passive range of motion is full and does not cause pain or crepitation. There is no pain with palpation along the medial femoral epicondyle or medial tibia and no pain with palpation over the lateral femoral epicondyle. There is no medial or lateral joint line tenderness. Chandan's maneuver is negative. There is no collateral ligament instability. Anterior drawer, Lachman and posterior drawer are negative. There is no soft tissue swelling distally into the leg. Neurocirculatory examination is intact. XR Results (most recent):  Results from Appointment encounter on 01/11/23    XR HIP RT W OR WO PELV 2-3 VWS    Narrative  Three-view x-ray of the right hip reveals a prior total hip arthroplasty with no lucencies identified.   He does have some heterotopic ossification noted along the superior portion of the capsule of the joint. Does not appear to be impinging upon the joint. XR Results (most recent):  Results from Appointment encounter on 01/11/23    XR HIP RT W OR WO PELV 2-3 VWS    Narrative  Three-view x-ray of the right hip reveals a prior total hip arthroplasty with no lucencies identified. He does have some heterotopic ossification noted along the superior portion of the capsule of the joint. Does not appear to be impinging upon the joint. XR KNEE RT MIN 4 V    Narrative  4 view x-ray of the right knee reveals significant narrowing of the medial joint space with minimal osteophytic changes. Patient does have bone-on-bone arthritis of the patellofemoral joint bilaterally. ASSESSMENT/PLAN:      Patient presents to the office today with evidence of osteoarthritis of the right knee. He has now developed an effusion of the knee joint. We have discussed treatment options. I think it be reasonable to consider aspiration as it appears the aspiration of the joint would provide him pretty decent pain control. However, I would also recommend a cortisone injection. Patient agrees with this. Consent for the injection was obtained. Risk of postinjection infection, lack of improvement, hypopigmentation and unusual allergic reaction were explained to the patient. After consent, the skin was sterilely prepped and 80 mg of Depo-Medrol and 5 cc of 0.25% plain Marcaine was was injected in the right knee. Aspiration of 21 cc of sterile fluid was also drained from the knee. Patient had no complications. Patient is to ice modify activities for 24 hours.   Patient is to return to the office if no improvement      Tavo Beatty MD

## 2023-01-11 NOTE — LETTER
1/11/2023    Patient: Braulio Barfield   YOB: 1950   Date of Visit: 1/11/2023     Howard Wood MD  [address] [id number]  [address] [facility] [address]    Dear Howard Wood MD,      Thank you for referring Mr. Emili Sol to [facility] for evaluation. My notes for this consultation are attached. If you have questions, please do not hesitate to call me. I look forward to following your patient along with you.       Sincerely,    Yamilet Danielle MD

## 2023-01-25 ENCOUNTER — OFFICE VISIT (OUTPATIENT)
Dept: ORTHOPEDIC SURGERY | Age: 73
End: 2023-01-25
Payer: MEDICARE

## 2023-01-25 DIAGNOSIS — M25.561 ACUTE PAIN OF RIGHT KNEE: Primary | ICD-10-CM

## 2023-01-25 DIAGNOSIS — S83.241D ACUTE MEDIAL MENISCUS TEAR OF RIGHT KNEE, SUBSEQUENT ENCOUNTER: ICD-10-CM

## 2023-01-25 NOTE — PROGRESS NOTES
Kanu Mcdowell (: 1950) is a 81 y.o. male, patient, here for evaluation of the following chief complaint(s):  Knee Pain (Right knee )       HPI:    Patient presents the office with recurrent discomfort of the right knee. Despite aspiration injection, patient continues to have on and off knee discomfort. It is located along the medial aspect of the knee. He has now started to limp and irritating his left hip. Allergies   Allergen Reactions    Codeine Nausea and Vomiting       Current Outpatient Medications   Medication Sig    glimepiride (AMARYL) 1 mg tablet Take 1 Tablet by mouth Daily (before breakfast). atorvastatin (LIPITOR) 40 mg tablet Take 1 Tablet by mouth daily. lisinopriL (PRINIVIL, ZESTRIL) 40 mg tablet Take 40 mg by mouth daily. hydrALAZINE (APRESOLINE) 25 mg tablet Take 25 mg by mouth two (2) times a day. tadalafiL (Cialis) 5 mg tablet Take 1 Tab by mouth daily. amoxicillin (AMOXIL) 500 mg capsule TAKE FOUR CAPSULES BY MOUTH AT ONCE AS ONE DOSE 1 HOUR BEFORE DENTAL WORK    loratadine (CLARITIN) 10 mg tablet Take 10 mg by mouth daily. cholecalciferol, vitamin D3, (Vitamin D3) 50 mcg (2,000 unit) tab Take  by mouth daily. amLODIPine (NORVASC) 5 mg tablet Take 5 mg by mouth daily. aspirin delayed-release 81 mg tablet Take 1 Tab by mouth two (2) times a day. Indications: blood clot prevention (Patient taking differently: Take 81 mg by mouth daily. Indications: blood clot prevention)     No current facility-administered medications for this visit.        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    Organ donor     ON BRAIN DONOR LIST    Prostate [facility])     XRT        Past Surgical History: Procedure Laterality Date    HX GI      COLONOSCOPY    HX HEENT      WISDOM TEETH     HX HEENT      BCCA REMOVED FROM FACE     HX HIP REPLACEMENT Right     HX OTHER SURGICAL  2020    BCCA REMOVED FROM LEFT CHEST     HX POLYPECTOMY      [place] ARTHRP ACETBLR/PROX FEM PROSTC AGRFT/ALGRFT Right 03/17/2020    CO PROSTATE BIOPSY, NEEDLE, SATURATION SAMPLING  2020    [place] UNLISTED PROCEDURE CARDIAC SURGERY  02/2021    CARDIAC CATHETERIZATION        Family History   Problem Relation Age of Onset    Colon Cancer Mother 77    Lung Cancer Father 77        smoker    Coronary Art Dis Father     Cancer Sister         brain/bone cancer -- ?primary    Ovarian Cancer Sister     Anesth Problems Neg Hx         Social History     Socioeconomic History    Marital status:      Spouse name: Not on file    Number of children: Not on file    Years of education: Not on file    Highest education level: Not on file   Occupational History    Occupation: investments   Tobacco Use    Smoking status: Never    Smokeless tobacco: Never   Vaping Use    Vaping Use: Never used   Substance and Sexual Activity    Alcohol use: Yes     Alcohol/week: 7.0 standard drinks     Types: 7 Shots of liquor per week    Drug use: Never    Sexual activity: Not Currently   Other Topics Concern    Not on file   Social History Narrative    Not on file     Social Determinants of Health     Financial Resource Strain: Not on file   Food Insecurity: Not on file   Transportation Needs: Not on file   Physical Activity: Not on file   Stress: Not on file   Social Connections: Not on file   Intimate Partner Violence: Not on file   Housing Stability: Not on file       Review of Systems   Musculoskeletal:         Right knee pain      Vitals: There were no vitals taken for this visit. There is no height or weight on file to calculate BMI. Ortho Exam     Patient is alert and oriented x3. Patient is in no acute distress. Patient ambulates with a nonantalgic gait.     Right knee: 2+ effusion is noted. Patient has range of motion +5 -130 degrees. There is mild pain with manipulation the patella. Positive crepitation with manipulation of the patella. Positive medial joint line tenderness is noted. Patient's pain is slightly worsened with Chandan's maneuver. There is no lateral joint tenderness. Collateral ligaments are intact. Anterior drawer and posterior drawer negative. Lachman's test negative. There is no soft tissue swelling distally. Neurovascular examination is intact. Left knee: There is no abrasions, lacerations, ecchymosis or soft tissue swelling. No effusion is identified. There is no pain to palpation along the medial or lateral border of the patella. There is no pain or crepitation with manipulation of the patella. There is normal excursion of the patella. Patellar grind test is negative. Active and passive range of motion is full and does not cause pain or crepitation. There is no pain with palpation along the medial femoral epicondyle or medial tibia and no pain with palpation over the lateral femoral epicondyle. There is no medial or lateral joint line tenderness. Chandan's maneuver is negative. There is no collateral ligament instability. Anterior drawer, Lachman and posterior drawer are negative. There is no soft tissue swelling distally into the leg. Neurocirculatory examination is intact. Prior x-rays of the right knee did not reveal any significant signs of arthritis of the joint      ASSESSMENT/PLAN:    Patient returns to the office with recurrent discomfort of the right knee. Given his persistent pain I would suggest proceeding with an MRI evaluation. Patient agrees.   He is to return to the office after the Sheila Nunez MD

## 2023-01-25 NOTE — LETTER
1/25/2023    Patient: Anna Tellez   YOB: 1950   Date of Visit: 1/25/2023     Mirela Khanna MD  [address] [id number]  [address] [place]    Dear Mirela Khanna MD,      Thank you for referring Mr. Devang Squires to [facility] for evaluation. My notes for this consultation are attached. If you have questions, please do not hesitate to call me. I look forward to following your patient along with you.       Sincerely,    Tyrone Echavarria MD

## 2023-02-02 ENCOUNTER — OFFICE VISIT (OUTPATIENT)
Dept: ORTHOPEDIC SURGERY | Age: 73
End: 2023-02-02
Payer: MEDICARE

## 2023-02-02 DIAGNOSIS — M84.351A STRESS FRACTURE OF RIGHT FEMUR, INITIAL ENCOUNTER: Primary | ICD-10-CM

## 2023-02-02 NOTE — PROGRESS NOTES
Angelica Collins (: 1950) is a 81 y.o. male, patient, here for evaluation of the following chief complaint(s):  Knee Pain (Right knee pain )       HPI:    Patient returns to the office with recurrent discomfort of the right knee. He once again continues to have pain that is mostly located along the medial aspect of the knee. Allergies   Allergen Reactions    Codeine Nausea and Vomiting       Current Outpatient Medications   Medication Sig    glimepiride (AMARYL) 1 mg tablet Take 1 Tablet by mouth Daily (before breakfast). atorvastatin (LIPITOR) 40 mg tablet Take 1 Tablet by mouth daily. lisinopriL (PRINIVIL, ZESTRIL) 40 mg tablet Take 40 mg by mouth daily. hydrALAZINE (APRESOLINE) 25 mg tablet Take 25 mg by mouth two (2) times a day. tadalafiL (Cialis) 5 mg tablet Take 1 Tab by mouth daily. amoxicillin (AMOXIL) 500 mg capsule TAKE FOUR CAPSULES BY MOUTH AT ONCE AS ONE DOSE 1 HOUR BEFORE DENTAL WORK    loratadine (CLARITIN) 10 mg tablet Take 10 mg by mouth daily. cholecalciferol, vitamin D3, (Vitamin D3) 50 mcg (2,000 unit) tab Take  by mouth daily. amLODIPine (NORVASC) 5 mg tablet Take 5 mg by mouth daily. aspirin delayed-release 81 mg tablet Take 1 Tab by mouth two (2) times a day. Indications: blood clot prevention (Patient taking differently: Take 81 mg by mouth daily. Indications: blood clot prevention)     No current facility-administered medications for this visit.        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    Organ donor     ON BRAIN DONOR LIST    Prostate CA ([facility] [address]     XRT        Past Surgical History:   Procedure Laterality Date    HX GI      COLONOSCOPY    HX HEENT      WISDOM TEETH     HX HEENT BCCA REMOVED FROM FACE     HX HIP REPLACEMENT Right     HX OTHER SURGICAL  2020    BCCA REMOVED FROM LEFT CHEST     HX POLYPECTOMY      [place] ARTHRP ACETBLR/PROX FEM PROSTC AGRFT/ALGRFT Right 03/17/2020    [place] PROSTATE BIOPSY, NEEDLE, SATURATION SAMPLING  2020    [place] UNLISTED PROCEDURE CARDIAC SURGERY  02/2021    CARDIAC CATHETERIZATION        Family History   Problem Relation Age of Onset    Colon Cancer Mother 77    Lung Cancer Father 77        smoker    Coronary Art Dis Father     Cancer Sister         brain/bone cancer -- ?primary    Ovarian Cancer Sister     Anesth Problems Neg Hx         Social History     Socioeconomic History    Marital status:      Spouse name: Not on file    Number of children: Not on file    Years of education: Not on file    Highest education level: Not on file   Occupational History    Occupation: investments   Tobacco Use    Smoking status: Never    Smokeless tobacco: Never   Vaping Use    Vaping Use: Never used   Substance and Sexual Activity    Alcohol use: Yes     Alcohol/week: 7.0 standard drinks     Types: 7 Shots of liquor per week    Drug use: Never    Sexual activity: Not Currently   Other Topics Concern    Not on file   Social History Narrative    Not on file     Social Determinants of Health     Financial Resource Strain: Not on file   Food Insecurity: Not on file   Transportation Needs: Not on file   Physical Activity: Not on file   Stress: Not on file   Social Connections: Not on file   Intimate Partner Violence: Not on file   Housing Stability: Not on file       Review of Systems   Musculoskeletal:         Right knee pain      Vitals: There were no vitals taken for this visit. There is no height or weight on file to calculate BMI. Ortho Exam   Patient is alert and oriented x3. Patient is in no acute distress. Patient ambulates with a nonantalgic gait. Right knee: 2+ effusion is noted. Patient has range of motion +5 -130 degrees.  There is mild pain with manipulation the patella. Positive crepitation with manipulation of the patella. Positive medial joint line tenderness is noted. Patient's pain is slightly worsened with Chandan's maneuver. There is no lateral joint tenderness. Collateral ligaments are intact. Anterior drawer and posterior drawer negative. Lachman's test negative. There is no soft tissue swelling distally. Neurovascular examination is intact. Left knee: There is no abrasions, lacerations, ecchymosis or soft tissue swelling. No effusion is identified. There is no pain to palpation along the medial or lateral border of the patella. There is no pain or crepitation with manipulation of the patella. There is normal excursion of the patella. Patellar grind test is negative. Active and passive range of motion is full and does not cause pain or crepitation. There is no pain with palpation along the medial femoral epicondyle or medial tibia and no pain with palpation over the lateral femoral epicondyle. There is no medial or lateral joint line tenderness. Chandan's maneuver is negative. There is no collateral ligament instability. Anterior drawer, Lachman and posterior drawer are negative. There is no soft tissue swelling distally into the leg. Neurocirculatory examination is intact. Prior x-rays of the right knee did not reveal any significant signs of arthritis of the joint    MRI evaluation shows evidence of a complex tear the posterior horn the medial meniscus. It also appears there is a radial component at the root. In addition, there is significant edema along the medial femur consistent with stress reaction        ASSESSMENT/PLAN:    I have gone over the MRI with the patient. He has several causes of medial based knee pain. He has a meniscus tear that is led to a stress reaction of the femur. In addition of this he has arthritis. We have discussed different treatment options and different series.   We have decided to treat the stress fracture with weight reduction. I have asked him to use a cane and limit his ambulation over the next 2 to 3 weeks. They were able to get control of his pain and get some sort of reduction of the stress reaction this may be all we need to move forward. However, I did talk to him about the potential of meniscus surgery. I also talked about the potential for partial knee replacement if he has no improvement. At this stage, I would ask him to return to the office in 3 weeks for repeat x-ray and repeat evaluation of his knee.         Chriss Hammans, MD

## 2023-02-02 NOTE — LETTER
2/2/2023    Patient: Sandy Quintero   YOB: 1950   Date of Visit: 2/2/2023     Verna Perez MD  [address] [id number]  [address] [place]    Dear Verna Perez MD,      Thank you for referring Mr. Rosalba Guerrero to [facility] for evaluation. My notes for this consultation are attached. If you have questions, please do not hesitate to call me. I look forward to following your patient along with you.       Sincerely,    Yemi Layton MD

## 2023-02-23 ENCOUNTER — OFFICE VISIT (OUTPATIENT)
Dept: ORTHOPEDIC SURGERY | Age: 73
End: 2023-02-23
Payer: MEDICARE

## 2023-02-23 DIAGNOSIS — M17.11 PRIMARY OSTEOARTHRITIS OF RIGHT KNEE: ICD-10-CM

## 2023-02-23 DIAGNOSIS — S83.231D COMPLEX TEAR OF MEDIAL MENISCUS OF RIGHT KNEE AS CURRENT INJURY, SUBSEQUENT ENCOUNTER: ICD-10-CM

## 2023-02-23 DIAGNOSIS — M25.561 ACUTE PAIN OF RIGHT KNEE: Primary | ICD-10-CM

## 2023-02-23 NOTE — PROGRESS NOTES
Sarah Haile (: 1950) is a 72 y.o. male, patient, here for evaluation of the following chief complaint(s):  Knee Pain (Right knee )       HPI:    Patient returns to clinic today for follow-up evaluation of his right knee. Patient has been diligent with his modified weightbearing and cane use. He thinks his pain is slightly better. He says his pain he was experiencing at night has improved. He has transition to pool activity and has been tolerating that well. He has not been doing a lot of his normal length the daily walks as instructed so he says he has a hard time gauging how much she is actually improved. He does state he still feels like his knee is swollen. Allergies   Allergen Reactions    Codeine Nausea and Vomiting       Current Outpatient Medications   Medication Sig    glimepiride (AMARYL) 1 mg tablet Take 1 Tablet by mouth Daily (before breakfast). atorvastatin (LIPITOR) 40 mg tablet Take 1 Tablet by mouth daily. lisinopriL (PRINIVIL, ZESTRIL) 40 mg tablet Take 40 mg by mouth daily. hydrALAZINE (APRESOLINE) 25 mg tablet Take 25 mg by mouth two (2) times a day. tadalafiL (Cialis) 5 mg tablet Take 1 Tab by mouth daily. amoxicillin (AMOXIL) 500 mg capsule TAKE FOUR CAPSULES BY MOUTH AT ONCE AS ONE DOSE 1 HOUR BEFORE DENTAL WORK    loratadine (CLARITIN) 10 mg tablet Take 10 mg by mouth daily. cholecalciferol, vitamin D3, (Vitamin D3) 50 mcg (2,000 unit) tab Take  by mouth daily. amLODIPine (NORVASC) 5 mg tablet Take 5 mg by mouth daily. aspirin delayed-release 81 mg tablet Take 1 Tab by mouth two (2) times a day. Indications: blood clot prevention (Patient taking differently: Take 81 mg by mouth daily. Indications: blood clot prevention)     No current facility-administered medications for this visit.        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 03/09/2020    Organ donor     ON BRAIN DONOR LIST    Prostate CA ([facility] [address]     XRT        Past Surgical History:   Procedure Laterality Date    HX GI      COLONOSCOPY    HX HEENT      WISDOM TEETH     HX HEENT      BCCA REMOVED FROM FACE     HX HIP REPLACEMENT Right     HX OTHER SURGICAL  2020    BCCA REMOVED FROM LEFT CHEST     HX POLYPECTOMY      IL ARTHRP ACETBLR/PROX FEM PROSTC AGRFT/ALGRFT Right 03/17/2020    IL PROSTATE BIOPSY, NEEDLE, SATURATION SAMPLING  2020    [place] UNLISTED PROCEDURE CARDIAC SURGERY  02/2021    CARDIAC CATHETERIZATION        Family History   Problem Relation Age of Onset    Colon Cancer Mother 77    Lung Cancer Father 77        smoker    Coronary Art Dis Father     Cancer Sister         brain/bone cancer -- ?primary    Ovarian Cancer Sister     Anesth Problems Neg Hx         Social History     Socioeconomic History    Marital status:      Spouse name: Not on file    Number of children: Not on file    Years of education: Not on file    Highest education level: Not on file   Occupational History    Occupation: investments   Tobacco Use    Smoking status: Never    Smokeless tobacco: Never   Vaping Use    Vaping Use: Never used   Substance and Sexual Activity    Alcohol use:  Yes     Alcohol/week: 7.0 standard drinks     Types: 7 Shots of liquor per week    Drug use: Never    Sexual activity: Not Currently   Other Topics Concern    Not on file   Social History Narrative    Not on file     Social Determinants of Health     Financial Resource Strain: Not on file   Food Insecurity: Not on file   Transportation Needs: Not on file   Physical Activity: Not on file   Stress: Not on file   Social Connections: Not on file   Intimate Partner Violence: Not on file   Housing Stability: Not on file       Review of Systems   Musculoskeletal:         Right knee Vitals: There were no vitals taken for this visit. There is no height or weight on file to calculate BMI. Ortho Exam     Patient is alert and oriented x3. Patient is in no acute distress. Patient ambulates with a nonantalgic gait. Right knee: 1+ effusion is noted. Patient has range of motion 0 -130 degrees. There is mild pain with manipulation the patella. Positive crepitation with manipulation of the patella. Positive medial joint line tenderness is noted. Patient's pain is slightly worsened with Chandan's maneuver. There is no lateral joint tenderness. Collateral ligaments are intact. Anterior drawer and posterior drawer negative. Lachman's test negative. There is no soft tissue swelling distally. Neurovascular examination is intact. Left knee: There is no abrasions, lacerations, ecchymosis or soft tissue swelling. No effusion is identified. There is no pain to palpation along the medial or lateral border of the patella. There is no pain or crepitation with manipulation of the patella. There is normal excursion of the patella. Patellar grind test is negative. Active and passive range of motion is full and does not cause pain or crepitation. There is no pain with palpation along the medial femoral epicondyle or medial tibia and no pain with palpation over the lateral femoral epicondyle. There is no medial or lateral joint line tenderness. Chandan's maneuver is negative. There is no collateral ligament instability. Anterior drawer, Lachman and posterior drawer are negative. There is no soft tissue swelling distally into the leg. Neurocirculatory examination is intact. XR Results (most recent):  Results from Appointment encounter on 02/23/23    XR KNEE RT 3 V    Narrative  Three-view x-ray of the right knee once again shows significant arthritis of the patellofemoral joint bilaterally. He does have narrowing of the medial joint space.   However he has no significant osteophytic changes       ASSESSMENT/PLAN:    We discussed with the patient today that his radiographs do not show any signs of the stress reaction becoming more evident, in other words, a black line. He has been protecting his weightbearing for the past several weeks and in the capacity of his current activity level seems to be slightly improving. Moving forward, we have instructed him to start to slowly return to his activities of daily living including his daily walking routine. He is to monitor his symptoms over the next 3 to 4 weeks. If the patient notices an improvement then we will take that is an indication that his stress fracture healed. However, with continuation or recurrence of symptoms we feel that this is likely from his osteoarthritis/meniscal pathology. At that time, we will recommend proceeding forward with a right knee arthroscopy for partial meniscectomy and assessment of his chondral surfaces. The other option would be to consider total knee replacement. He again does not have bone-on-bone arthritis in the medial compartment but he does have pretty significant arthritis of the patellofemoral joint. Again, we will have to discuss this as we move forward. Patient understood our discussion and rationale for plan of care. He was in agreement and would like to proceed. He will reach out to us in the next few weeks to let us know how he was doing and if he needs to move forward with the scheduling of surgery or not.         Mendel Whitten MD

## 2023-02-23 NOTE — LETTER
2/23/2023    Patient: Bianca Culver   YOB: 1950   Date of Visit: 2/23/2023     Donell Garcia MD  [address] [id number]  [address] [place] [facility] [address]    Dear Donell Garcia MD,      Thank you for referring Mr. Sadia Wells to [facility] for evaluation. My notes for this consultation are attached. If you have questions, please do not hesitate to call me. I look forward to following your patient along with you.       Sincerely,    Rodrigo Hernandez MD

## 2023-03-02 DIAGNOSIS — S83.231D COMPLEX TEAR OF MEDIAL MENISCUS OF RIGHT KNEE AS CURRENT INJURY, SUBSEQUENT ENCOUNTER: Primary | ICD-10-CM

## 2023-03-13 DIAGNOSIS — Z98.890 HISTORY OF ARTHROSCOPY OF RIGHT KNEE: Primary | ICD-10-CM

## 2023-03-14 DIAGNOSIS — Z98.890 HISTORY OF ARTHROSCOPY OF RIGHT KNEE: Primary | ICD-10-CM

## 2023-03-14 RX ORDER — HYDROMORPHONE HYDROCHLORIDE 2 MG/1
2 TABLET ORAL
Qty: 30 TABLET | Refills: 0 | Status: SHIPPED | OUTPATIENT
Start: 2023-03-14 | End: 2023-03-19

## 2023-03-14 RX ORDER — HYDROCODONE BITARTRATE AND ACETAMINOPHEN 5; 325 MG/1; MG/1
1 TABLET ORAL
Qty: 30 TABLET | Refills: 0 | Status: SHIPPED | OUTPATIENT
Start: 2023-03-14 | End: 2023-03-19

## 2023-03-23 ENCOUNTER — OFFICE VISIT (OUTPATIENT)
Dept: ORTHOPEDIC SURGERY | Age: 73
End: 2023-03-23
Payer: MEDICARE

## 2023-03-23 VITALS — HEIGHT: 71 IN | WEIGHT: 189 LBS | BODY MASS INDEX: 26.46 KG/M2

## 2023-03-23 DIAGNOSIS — Z98.890 HISTORY OF ARTHROSCOPY OF RIGHT KNEE: Primary | ICD-10-CM

## 2023-03-23 PROCEDURE — 99024 POSTOP FOLLOW-UP VISIT: CPT | Performed by: ORTHOPAEDIC SURGERY

## 2023-03-23 NOTE — LETTER
3/23/2023    Patient: Jacquelyn De La O   YOB: 1950   Date of Visit: 3/23/2023     Ольга Novak MD  [address] [address]  [address] [place] [facility] [address]    Dear Ольга Novak MD,      Thank you for referring Mr. Liz Epps to [facility] for evaluation. My notes for this consultation are attached. If you have questions, please do not hesitate to call me. I look forward to following your patient along with you.       Sincerely,    Lady Juliann MD

## 2023-03-23 NOTE — PROGRESS NOTES
Torres Gibbs (: 1950) is a 72 y.o. male, patient, here for evaluation of the following chief complaint(s):  Surgical Follow-up and Knee Pain (right)       HPI:    Patient returns to the office now status post arthroscopy of the right knee. He has been doing well. He has entered back into the gym is been doing some exercises. He is had some swelling in his leg as he describes he is noted some soreness mostly along his thigh region. He denies calf pain he denies shortness of breath. Allergies   Allergen Reactions    Codeine Nausea and Vomiting       Current Outpatient Medications   Medication Sig    glimepiride (AMARYL) 1 mg tablet Take 1 Tablet by mouth Daily (before breakfast). atorvastatin (LIPITOR) 40 mg tablet Take 1 Tablet by mouth daily. lisinopriL (PRINIVIL, ZESTRIL) 40 mg tablet Take 40 mg by mouth daily. hydrALAZINE (APRESOLINE) 25 mg tablet Take 25 mg by mouth two (2) times a day. tadalafiL (Cialis) 5 mg tablet Take 1 Tab by mouth daily. amoxicillin (AMOXIL) 500 mg capsule TAKE FOUR CAPSULES BY MOUTH AT ONCE AS ONE DOSE 1 HOUR BEFORE DENTAL WORK    loratadine (CLARITIN) 10 mg tablet Take 10 mg by mouth daily. cholecalciferol, vitamin D3, 50 mcg (2,000 unit) tab Take  by mouth daily. amLODIPine (NORVASC) 5 mg tablet Take 5 mg by mouth daily. aspirin delayed-release 81 mg tablet Take 1 Tab by mouth two (2) times a day. Indications: blood clot prevention (Patient taking differently: Take 81 mg by mouth daily. Indications: blood clot prevention)     No current facility-administered medications for this visit.        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    Organ donor     ON BRAIN DONOR LIST Prostate CA ([facility] [address]     XRT        Past Surgical History:   Procedure Laterality Date    HX GI      COLONOSCOPY    HX HEENT      WISDOM TEETH     HX HEENT      BCCA REMOVED FROM FACE     HX HIP REPLACEMENT Right     HX OTHER SURGICAL  2020    BCCA REMOVED FROM LEFT CHEST     HX POLYPECTOMY      OR ARTHRP ACETBLR/PROX FEM PROSTC AGRFT/ALGRFT Right 03/17/2020    OR PROSTATE BIOPSY, NEEDLE, SATURATION SAMPLING  2020    OR UNLISTED PROCEDURE CARDIAC SURGERY  02/2021    CARDIAC CATHETERIZATION        Family History   Problem Relation Age of Onset    Colon Cancer Mother 77    Lung Cancer Father 77        smoker    Coronary Art Dis Father     Cancer Sister         brain/bone cancer -- ?primary    Ovarian Cancer Sister     Anesth Problems Neg Hx         Social History     Socioeconomic History    Marital status:      Spouse name: Not on file    Number of children: Not on file    Years of education: Not on file    Highest education level: Not on file   Occupational History    Occupation: investments   Tobacco Use    Smoking status: Never    Smokeless tobacco: Never   Vaping Use    Vaping Use: Never used   Substance and Sexual Activity    Alcohol use: Yes     Alcohol/week: 7.0 standard drinks     Types: 7 Shots of liquor per week    Drug use: Never    Sexual activity: Not Currently   Other Topics Concern    Not on file   Social History Narrative    Not on file     Social Determinants of Health     Financial Resource Strain: Not on file   Food Insecurity: Not on file   Transportation Needs: Not on file   Physical Activity: Not on file   Stress: Not on file   Social Connections: Not on file   Intimate Partner Violence: Not on file   Housing Stability: Not on file       Review of Systems    Vitals:  Ht 5' 11\" (1.803 m)   Wt 189 lb (85.7 kg)   BMI 26.36 kg/m²    Body mass index is 26.36 kg/m². Ortho Exam     Right knee: Portal sites are healing well. He has a 2+ knee effusion.   He does have ecchymosis that is noted along the posterior aspect of the thigh right at the level of where the tourniquet was located. He does carry some edema +1 pitting edema that is noted along the lower aspect of the pretibial area of his right shin and into his ankle. His calf is soft and supple. Range of motion is 0-90 degrees. Neurovascular examinations intact. ASSESSMENT/PLAN:    Patient is progressing well. I have asked him to concentrate a little bit more and elevation during the day he certainly can advance his activities but I would intermittently elevate the leg and maybe even ice that to help with some of the swelling. I would also recommend physical therapy. I have gone over management of his portal sites. He is to return to the office in 4 weeks.   He is currently taking aspirin for DVT prophylaxis        Marine Treviño MD

## 2023-04-03 ENCOUNTER — OFFICE VISIT (OUTPATIENT)
Dept: ORTHOPEDIC SURGERY | Age: 73
End: 2023-04-03
Payer: MEDICARE

## 2023-04-03 DIAGNOSIS — Z98.890 HISTORY OF ARTHROSCOPY OF RIGHT KNEE: ICD-10-CM

## 2023-04-03 DIAGNOSIS — M25.561 RIGHT KNEE PAIN, UNSPECIFIED CHRONICITY: Primary | ICD-10-CM

## 2023-04-03 DIAGNOSIS — R26.2 DIFFICULTY WALKING: ICD-10-CM

## 2023-04-03 PROCEDURE — 97110 THERAPEUTIC EXERCISES: CPT | Performed by: PHYSICAL THERAPIST

## 2023-04-03 PROCEDURE — 97161 PT EVAL LOW COMPLEX 20 MIN: CPT | Performed by: PHYSICAL THERAPIST

## 2023-04-03 NOTE — PROGRESS NOTES
KNEE EVALUATION    Patient Name: Renate No  Date:4/3/2023  : 1950  [x]  Patient  Verified  Payor: Sabrina Seo / Plan: [place] / Product Type: Medicare /    Total Treatment Time (min): 45  Total Timed Codes (min): 45  1:1 Treatment Time ( [address] only): 45  Referring Physician:   Mona Eller MD       1. Right knee pain, unspecified chronicity  2. Difficulty walking  3. History of arthroscopy of right knee      SUBJECTIVE  Patient is a 74-year-old male referred to physical therapy by Dr. Sven Lin status post right knee arthroscopy with partial medial meniscectomy performed on 3/14. Of note, he did have a stress fracture in his right femur in February. Pain has been tolerable since surgery. He is taking Tylenol for relief. He admits he has not been icing and elevating as much as he should. He has already gone back to the gym some. He was previously walking 5 miles on a daily basis and overall very physically active. He works from home. He would like to return to previous level of activity. Past Medical History:   Diagnosis Date    Arthritis     Basal cell carcinoma 2021    FACE     Basal cell carcinoma (BCC) of chest 2019    LEFT SIDE     CAD (coronary artery disease)     Chronic pain     DM (diabetes mellitus) (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 [address]     XRT        OBJECTIVE  Gait  Ambulates with good early mechanics, diminished heel strike at initial contact and knee flexion with swing phase    Functional tests  Able to rise on toes and on heels, mildly painful. He is able to maintain single-leg stance for 5 seconds on the left, unable to do so on the right. Observations  He does have large amount of swelling in the right lower extremity, 3 cm greater around the malleoli, 1.5 cm greater at mid patella compared to the left side.   Portal sites healing appropriately without evidence of infection    Palpation  Calf is nontender    AROM  0 degrees to 120 degrees. 0 degrees 130 degrees on the left    PROM  Approximately 3 degrees of hyperextension to 130 degrees with mild discomfort at endrange    Strength  Good early isometric quad contraction. He can perform a straight leg raise without lag    Flexibility  Restricted  hip flexor, quadriceps, and hamstring mobility noted. Joint mobility  Good early patella mobility    Special tests  Negative Rozanne Columbia' sign    LEFS  48    Treatment:  Performed evaluation (20 minutes). Therapeutic exercise (25 minutes): Patellafemoral and tibiofemoral joint mobilization. Passive range of motion in supine. Soft tissue mobilization/myofascial release to peripatellar structures, quad, IT band in modified supine. provided and instructed the patient in a home exercise program for range of motion and quad/hip strengthening. He performed exercises in the clinic focused on the same. We discussed patient goals and collaborated about a progressive plan of care. Game ready and elevation x10 minutes. ASSESSMENT    Patient presents with impairments related to gait, swelling, range of motion, quad/hip strength, balance, flexibility, ability to ambulate long distances, negotiate stairs, and participate in desired activity following right knee arthroscopy. I do feel he may be doing a bit too much which is contributing to lower leg swelling. He encouraged him to be more diligent with icing/elevating after home exercises. He will benefit from skilled outpatient physical therapy services to address above limitations and maximize function. Short-Term Goals (1 week)  1. Patient will be independent with home exercise program to facilitate recovery. Long-Term Goals (4-6 weeks)  1. Patient will be a functional ambulator without exacerbation of knee pain from resting baseline values.   2. Patient will demonstrate independent ability to squat to 90 degrees to use the toilet without increased knee pain. 3. Patient will negotiate stairs without exacerbation of pain from baseline level. 4. Patient will independently perform all self-directed ADLs without exacerbation of pain from baseline. Plan:    2x weekly. Duration 20 visits. Interventions to include but are not limited to joint mobilizations, soft tissue mobilization, myofascial release, therapeutic exercise, neuromuscular reeducation, dry needling, taping, and modalities as indicated. Omar Adams, PT 4/3/2023  3:00 PM    The referring physician has reviewed and approved this evaluation and plan of care as noted by the electronic signature attached to note.

## 2023-04-07 ENCOUNTER — OFFICE VISIT (OUTPATIENT)
Dept: ORTHOPEDIC SURGERY | Age: 73
End: 2023-04-07

## 2023-04-20 ENCOUNTER — OFFICE VISIT (OUTPATIENT)
Dept: ORTHOPEDIC SURGERY | Age: 73
End: 2023-04-20
Payer: MEDICARE

## 2023-04-20 DIAGNOSIS — Z98.890 STATUS POST ARTHROSCOPY OF RIGHT KNEE: Primary | ICD-10-CM

## 2023-04-20 PROCEDURE — 99024 POSTOP FOLLOW-UP VISIT: CPT | Performed by: ORTHOPAEDIC SURGERY

## 2023-04-20 NOTE — PROGRESS NOTES
Sosa Steinberg (: 1950) is a 72 y.o. male, patient, here for evaluation of the following chief complaint(s):  Knee Pain (Right knee )       HPI:    Patient is here today for follow up evaluation of his right knee. He has been doing well and improving. His activity level has increased, walking up to two miles/day. He is not requiring the use of a cane anymore. Patient is pleased with his progress. Allergies   Allergen Reactions    Codeine Nausea and Vomiting       Current Outpatient Medications   Medication Sig    glimepiride (AMARYL) 1 mg tablet Take 1 Tablet by mouth Daily (before breakfast). atorvastatin (LIPITOR) 40 mg tablet Take 1 Tablet by mouth daily. lisinopriL (PRINIVIL, ZESTRIL) 40 mg tablet Take 40 mg by mouth daily. hydrALAZINE (APRESOLINE) 25 mg tablet Take 25 mg by mouth two (2) times a day. tadalafiL (Cialis) 5 mg tablet Take 1 Tab by mouth daily. amoxicillin (AMOXIL) 500 mg capsule TAKE FOUR CAPSULES BY MOUTH AT ONCE AS ONE DOSE 1 HOUR BEFORE DENTAL WORK    loratadine (CLARITIN) 10 mg tablet Take 10 mg by mouth daily. cholecalciferol, vitamin D3, 50 mcg (2,000 unit) tab Take  by mouth daily. amLODIPine (NORVASC) 5 mg tablet Take 5 mg by mouth daily. aspirin delayed-release 81 mg tablet Take 1 Tab by mouth two (2) times a day. Indications: blood clot prevention (Patient taking differently: Take 81 mg by mouth daily. Indications: blood clot prevention)     No current facility-administered medications for this visit.        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    Organ donor     ON BRAIN DONOR LIST    Prostate [facility])     XRT        Past Surgical History:   Procedure Laterality Date HX GI      COLONOSCOPY    HX HEENT      WISDOM TEETH     HX HEENT      BCCA REMOVED FROM FACE     HX HIP REPLACEMENT Right     HX OTHER SURGICAL  2020    BCCA REMOVED FROM LEFT CHEST     HX POLYPECTOMY      [place] ARTHRP ACETBLR/PROX FEM PROSTC AGRFT/ALGRFT Right 03/17/2020    [place] PROSTATE BIOPSY, NEEDLE, SATURATION SAMPLING  2020    [place] UNLISTED PROCEDURE CARDIAC SURGERY  02/2021    CARDIAC CATHETERIZATION        Family History   Problem Relation Age of Onset    Colon Cancer Mother 77    Lung Cancer Father 77        smoker    Coronary Art Dis Father     Cancer Sister         brain/bone cancer -- ?primary    Ovarian Cancer Sister     Anesth Problems Neg Hx         Social History     Socioeconomic History    Marital status:      Spouse name: Not on file    Number of children: Not on file    Years of education: Not on file    Highest education level: Not on file   Occupational History    Occupation: investments   Tobacco Use    Smoking status: Never    Smokeless tobacco: Never   Vaping Use    Vaping Use: Never used   Substance and Sexual Activity    Alcohol use: Yes     Alcohol/week: 7.0 standard drinks     Types: 7 Shots of liquor per week    Drug use: Never    Sexual activity: Not Currently   Other Topics Concern    Not on file   Social History Narrative    Not on file     Social Determinants of Health     Financial Resource Strain: Not on file   Food Insecurity: Not on file   Transportation Needs: Not on file   Physical Activity: Not on file   Stress: Not on file   Social Connections: Not on file   Intimate Partner Violence: Not on file   Housing Stability: Not on file       Review of Systems   Musculoskeletal:         Right knee      Vitals: There were no vitals taken for this visit. There is no height or weight on file to calculate BMI. Ortho Exam     Patient is alert and oriented x 3. He is in no acute distress. He is walking without assistive device. Right knee:  incisions are well healed.   Small effusion. Distal edema is improving. Range of motion is 0-115. Calves are soft and non tender. Significant decrease in edema is noted. He has no tenderness to palpation on the medial joint line or medial femoral condyle. ASSESSMENT/PLAN:    Patient has made significant improvement since his last visit. His pain, range of motion, and swelling have improved. He has been released by physical therapy. He may continue to increase his activity level as he can tolerate. We will have him come back to see us on an as needed basis moving forward.          Ankit Carpio MD

## 2023-05-16 PROBLEM — Z79.899 ENCOUNTER FOR LONG-TERM (CURRENT) USE OF MEDICATIONS: Status: ACTIVE | Noted: 2023-05-16

## 2023-05-16 PROBLEM — Z79.899 ENCOUNTER FOR LONG-TERM (CURRENT) USE OF OTHER MEDICATIONS: Status: RESOLVED | Noted: 2021-05-18 | Resolved: 2023-05-16

## 2023-12-13 ENCOUNTER — HOSPITAL ENCOUNTER (OUTPATIENT)
Facility: HOSPITAL | Age: 73
Discharge: HOME | End: 2023-12-16

## 2023-12-13 VITALS
WEIGHT: 187 LBS | HEIGHT: 71 IN | DIASTOLIC BLOOD PRESSURE: 81 MMHG | HEART RATE: 64 BPM | SYSTOLIC BLOOD PRESSURE: 139 MMHG | BODY MASS INDEX: 26.18 KG/M2

## 2023-12-13 DIAGNOSIS — C61 PROSTATE CA (HCC): Primary | ICD-10-CM

## 2023-12-13 NOTE — PROGRESS NOTES
one a few year ago and he is now due. He next sees Dr. Morelia Albright on 9/24/24. HISTORY OF PRESENT ILLNESS AT CONSULT:      Mr Burgess Albarran is a 81year old white man with BCC chest (follows with Dr. Jakob Salinas in dermatology), CAD (no stents, no surgery, denies MI), LUTS on daily Cialis, ED on triple mix, HTN, high cholesterol, and prostate cancer managed with active surveillance since 2020. He was referred to urological attention for elevated PSA in 2020. His pre- diagnosis PSA was 6.23 ng/mL on 6/29/2020. He was diagnosed with prostate cancer by needle biopsy. His biopsy was 8/3/2020 with Dr Akua Monique. The prostate was 42.52 cc by TRUS, with clinical stage T1c, and PSA density = 0.15 ng/mL/cc. Pathology revealed cancer in 2/12 cores with Peetz 3+3 adenocarcinoma in both positive cores. Oncotype report dated 8/20/2020 demonstrated a score of 31, listed as very low risk. MRI 3/9/2021 demonstrated a PI-RADS 3 lesion in the left base/mid gland peripheral zone. His second biopsy was 6/20/2021 with Dr Morelia Albright. The prostate was 45.39 cc by TRUS, with clinical stage T1c. Pathology revealed cancer in 5/12 cores with Peetz 3+3 in all positive cores. Perineural invasion was noted. MRI 3/31/2022 demonstrated a 47 cc prostate with 2 PI-RADS 4 lesions in the left mid gland peripheral zone. The more posterior lesion measured 14 x 9 x 9 mm, without extraprostatic extension. The more anterior lesion measured 7 x 6 x 6 mm, without extraprostatic extension. There was no seminal vesicle invasion, neurovascular bundles uninvolved, no lymphadenopathy, and no aggressive osseous lesions. His PSA was most recently measured as 4.81 ng/mL on 4/6/2022. His third biopsy was 4/19/2022 with Dr Morelia Albright. The prostate was 35.94 cc by TRUS, with clinical stage T1c. Pathology revealed cancer in 5/12 template cores + 2/2 MRI targets, with Peetz 3+3 present in all positive cores. Perineural invasion was noted in multiple cores.    He had a

## (undated) DEVICE — 3M™ IOBAN™ 2 ANTIMICROBIAL INCISE DRAPE 6651EZ: Brand: IOBAN™ 2

## (undated) DEVICE — SUTURE FIBERWIRE SZ 2 W/ TAPERED NEEDLE BLUE L38IN NONABSORB BLU L26.5MM 1/2 CIRCLE AR7200

## (undated) DEVICE — SOLUTION SURG PREP 26 CC PURPREP

## (undated) DEVICE — GOWN,PREVENTION PLUS,XLN/2XL,ST,22/CS: Brand: MEDLINE

## (undated) DEVICE — SOL IRR SOD CL 0.9% 3000ML --

## (undated) DEVICE — REM POLYHESIVE ADULT PATIENT RETURN ELECTRODE: Brand: VALLEYLAB

## (undated) DEVICE — YANKAUER,OPEN TIP,W/O VENT,STERILE: Brand: MEDLINE INDUSTRIES, INC.

## (undated) DEVICE — DRAPE,U/ SHT,SPLIT,PLAS,STERIL: Brand: MEDLINE

## (undated) DEVICE — SPONGE GZ W4XL4IN COT 12 PLY TYP VII WVN C FLD DSGN

## (undated) DEVICE — PAD,ABDOMINAL,5"X9",ST,LF,25/BX: Brand: MEDLINE INDUSTRIES, INC.

## (undated) DEVICE — GOWN,NON-REINFORCED,XXL: Brand: MEDLINE

## (undated) DEVICE — SCRUB DRY SURG EZ SCRUB BRUSH PREOPERATIVE GRN

## (undated) DEVICE — HEWSON SUTURE RETRIEVER: Brand: HEWSON SUTURE RETRIEVER

## (undated) DEVICE — PENCIL SMK EVAC L10FT DIA95MM TBNG NONSTICK W ADPT TO 22MM

## (undated) DEVICE — ALCOHOL RUBBING ISO 16OZ 70%

## (undated) DEVICE — DECANTER BAG 9": Brand: MEDLINE INDUSTRIES, INC.

## (undated) DEVICE — STERILE POLYISOPRENE POWDER-FREE SURGICAL GLOVES WITH EMOLLIENT COATING: Brand: PROTEXIS

## (undated) DEVICE — Device

## (undated) DEVICE — SOL IRR STRL H2O 1000ML BTL --

## (undated) DEVICE — SYR 20ML LL STRL LF --

## (undated) DEVICE — SUTURE MCRYL SZ 3-0 L27IN ABSRB UD L24MM PS-1 3/8 CIR PRIM Y936H

## (undated) DEVICE — HANDPIECE SET WITH BONE CLEANING TIP AND SUCTION TUBE: Brand: INTERPULSE

## (undated) DEVICE — MARKER,SKIN,WI/RULER AND LABELS: Brand: MEDLINE

## (undated) DEVICE — BIT DRL L5IN DIA2MM STD ST S STL TWST BUSA

## (undated) DEVICE — SUTURE VCRL SZ 2-0 L36IN ABSRB UD L40MM CT 1/2 CIR J957H

## (undated) DEVICE — SOL IRR SOD CL 0.9% 1000ML BTL --

## (undated) DEVICE — SPONGE GZ W4XL4IN COT RADPQ HIGHLY ABSRB

## (undated) DEVICE — SUTURE ETHBND EXCEL SZ 1 L30IN NONABSORBABLE GRN L36MM CT-1 X425H

## (undated) DEVICE — T4 HOOD

## (undated) DEVICE — 3M™ STERI-DRAPE™ 2 INCISE DRAPE 2050: Brand: STERI-DRAPE™

## (undated) DEVICE — COVER,MAYO STAND,STERILE: Brand: MEDLINE

## (undated) DEVICE — STRAP,POSITIONING,KNEE/BODY,FOAM,4X60": Brand: MEDLINE

## (undated) DEVICE — SUTURE VCRL SZ 1 L36IN ABSRB UD L36MM CT-1 1/2 CIR J947H

## (undated) DEVICE — INFECTION CONTROL KIT SYS

## (undated) DEVICE — NEEDLE HYPO 21GA L1.5IN INTRAMUSCULAR S STL LATCH BVL UP

## (undated) DEVICE — TUBING SUCT 12FR MAL ALUM SHFT FN CAP VENT UNIV CONN W/ OBT

## (undated) DEVICE — 4-PORT MANIFOLD: Brand: NEPTUNE 2

## (undated) DEVICE — SOLUTION IRRIG 3000ML 0.9% SOD CHL FLX CONT 0797208] ICU MEDICAL INC]

## (undated) DEVICE — PATIENT PROTECTIVE PAD FOR IMP UNIVERSAL LATERAL HIP POSITIONER (ULP) (6/CASE): Brand: PATIENT PROTECTIVE PAD

## (undated) DEVICE — ELECTRODE BLDE L4IN NONINSULATED EDGE

## (undated) DEVICE — IMMOBILIZER SHLDR M UNIV 65X17IN BLK LIGHWEIGHT PCH SZ REUSE

## (undated) DEVICE — PAD BD MATTRESS 73X32 IN STD CONVOLUTED FOAM LTX FREE

## (undated) DEVICE — BLADE SAW W098XL354IN THK0047IN CUT THK0047IN SAG

## (undated) DEVICE — SUTURE ETHBND EXCEL SZ 2 L30IN NONABSORBABLE GRN L40MM V-37 MX69G

## (undated) DEVICE — TIP SUCT CRV REG REDI

## (undated) DEVICE — STRIP,CLOSURE,WOUND,MEDI-STRIP,1/2X4: Brand: MEDLINE

## (undated) DEVICE — SOLUTION IRRIG 1000ML H2O STRL BLT

## (undated) DEVICE — DRAPE,REIN 53X77,STERILE: Brand: MEDLINE

## (undated) DEVICE — DERMABOND SKIN ADH 0.7ML -- DERMABOND ADVANCED 12/BX

## (undated) DEVICE — (D)PREP SKN CHLRAPRP APPL 26ML -- CONVERT TO ITEM 371833

## (undated) DEVICE — SOLUTION IV 50ML 0.9% SOD CHL

## (undated) DEVICE — COVER LT HNDL PLAS RIG 1 PER PK

## (undated) DEVICE — PACK SURG PROC KNEE USER GPS

## (undated) DEVICE — PREP KIT PEEL PTCH POVIDONE IOD

## (undated) DEVICE — GARMENT,MEDLINE,DVT,INT,CALF,MED, GEN2: Brand: MEDLINE

## (undated) DEVICE — SUTURE MCRYL SZ 4-0 L27IN ABSRB UD L24MM PS-1 3/8 CIR PRIM Y935H

## (undated) DEVICE — STERILE POLYISOPRENE POWDER-FREE SURGICAL GLOVES: Brand: PROTEXIS

## (undated) DEVICE — GRADUATED BOWL: Brand: DEVON

## (undated) DEVICE — 2108 SERIES SAGITTAL BLADE AGGRESSIVE  (25.0 X 1.19 X 85.0MM)

## (undated) DEVICE — 3M™ STERI-DRAPE™ U-DRAPE 1015: Brand: STERI-DRAPE™

## (undated) DEVICE — CONTAINER,SPECIMEN,3OZ,OR STRL: Brand: MEDLINE

## (undated) DEVICE — SUTURE STRATAFIX SPRL SZ 1 L14IN ABSRB VLT L48CM CTX 1/2 SXPD2B405

## (undated) DEVICE — PREP SKN PREVAIL 40ML APPL --

## (undated) DEVICE — Z DUP USE 2275493 DRESSING ALGINATE POST OPERATIVE 10X3.5 IN RECT PRIMASEAL